# Patient Record
Sex: FEMALE | Race: OTHER | Employment: FULL TIME | URBAN - METROPOLITAN AREA
[De-identification: names, ages, dates, MRNs, and addresses within clinical notes are randomized per-mention and may not be internally consistent; named-entity substitution may affect disease eponyms.]

---

## 2017-07-03 ENCOUNTER — OFFICE VISIT (OUTPATIENT)
Dept: INTERNAL MEDICINE CLINIC | Facility: CLINIC | Age: 22
End: 2017-07-03

## 2017-07-03 VITALS
BODY MASS INDEX: 27.19 KG/M2 | DIASTOLIC BLOOD PRESSURE: 70 MMHG | TEMPERATURE: 98 F | HEART RATE: 63 BPM | HEIGHT: 66 IN | WEIGHT: 169.19 LBS | SYSTOLIC BLOOD PRESSURE: 106 MMHG

## 2017-07-03 DIAGNOSIS — Z02.0 SCHOOL PHYSICAL EXAM: Primary | ICD-10-CM

## 2017-07-03 LAB
INDURATION (): 0 MM (ref 0–11)
RUBV IGG SER-ACNC: 35.1 IU/ML

## 2017-07-03 PROCEDURE — 99212 OFFICE O/P EST SF 10 MIN: CPT | Performed by: INTERNAL MEDICINE

## 2017-07-03 PROCEDURE — 99204 OFFICE O/P NEW MOD 45 MIN: CPT | Performed by: INTERNAL MEDICINE

## 2017-07-03 PROCEDURE — 86580 TB INTRADERMAL TEST: CPT | Performed by: INTERNAL MEDICINE

## 2017-07-03 PROCEDURE — 36415 COLL VENOUS BLD VENIPUNCTURE: CPT | Performed by: INTERNAL MEDICINE

## 2017-07-03 PROCEDURE — 90715 TDAP VACCINE 7 YRS/> IM: CPT | Performed by: INTERNAL MEDICINE

## 2017-07-03 PROCEDURE — 90471 IMMUNIZATION ADMIN: CPT | Performed by: INTERNAL MEDICINE

## 2017-07-05 ENCOUNTER — NURSE ONLY (OUTPATIENT)
Dept: INTERNAL MEDICINE CLINIC | Facility: CLINIC | Age: 22
End: 2017-07-05

## 2017-07-05 ENCOUNTER — TELEPHONE (OUTPATIENT)
Dept: INTERNAL MEDICINE CLINIC | Facility: CLINIC | Age: 22
End: 2017-07-05

## 2017-07-05 DIAGNOSIS — Z02.0 SCHOOL PHYSICAL EXAM: Primary | ICD-10-CM

## 2017-07-05 LAB
HBV SURFACE AB SER-ACNC: <3.1 MIU/ML (ref ?–10)
HBV SURFACE AG SERPL QL IA: NONREACTIVE
MEV IGG SER-ACNC: <5 AU/ML (ref 30–?)
MUV IGG SER IA-ACNC: 28.8 AU/ML (ref 11–?)

## 2017-07-05 PROCEDURE — 90746 HEPB VACCINE 3 DOSE ADULT IM: CPT | Performed by: INTERNAL MEDICINE

## 2017-07-05 PROCEDURE — 90707 MMR VACCINE SC: CPT | Performed by: INTERNAL MEDICINE

## 2017-07-05 PROCEDURE — 90472 IMMUNIZATION ADMIN EACH ADD: CPT | Performed by: INTERNAL MEDICINE

## 2017-07-05 PROCEDURE — 90471 IMMUNIZATION ADMIN: CPT | Performed by: INTERNAL MEDICINE

## 2017-07-06 NOTE — TELEPHONE ENCOUNTER
Pt wants to know if instead of getting the second step to the TB test she can have a Quantiferon blood draw?

## 2017-07-06 NOTE — PROGRESS NOTES
HPI:    Patient ID: Eric Vitale is a 19year old female. HPI  Today for first time to establish care and get school physical.  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 Denies any complaints.     Re INTRADERMAL TEST      Hepatitis B Surface Antibody [E]      TETANUS, DIPHTHERIA TOXOIDS AND ACELLULAR PERTUSIS VACCINE (TDAP), >7 YEARS, IM USE    Meds This Visit:  No prescriptions requested or ordered in this encounter    Imaging & Referrals:  TETANUS, D

## 2017-07-06 NOTE — TELEPHONE ENCOUNTER
Patient aware she can do quanteferon test instead of the second step. Will call office back to schedule appointment.

## 2017-07-07 ENCOUNTER — TELEPHONE (OUTPATIENT)
Dept: INTERNAL MEDICINE CLINIC | Facility: CLINIC | Age: 22
End: 2017-07-07

## 2017-07-07 DIAGNOSIS — Z02.0 SCHOOL PHYSICAL EXAM: Primary | ICD-10-CM

## 2017-07-07 NOTE — TELEPHONE ENCOUNTER
Reviewed paperwork of patient. Patient does need series for MMR and for Hep B since she is not immune. Patient already received first shot for MMR and Hep B. Need to come in for next shot in 30 days. Patient also need blood draw for varicella.  Ok to place

## 2017-07-07 NOTE — TELEPHONE ENCOUNTER
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

## 2017-07-07 NOTE — TELEPHONE ENCOUNTER
Spoke with patient, informed that the new guideline is if she has received series during childhood we do a booster shot then repeat labs in 30 days to check immunity. If immune, no need to do further shots.  If not immune, patient needs to do the full serie

## 2017-07-07 NOTE — TELEPHONE ENCOUNTER
Since PCP is not here, answering messages. For hep. B titres, needs 30 days after vaccine series done. For MMR titres, needs 30 days after MMR done. If she has first series done as child,  guidelines are one shot of MMR and hep.  B and if immune dose not ne

## 2017-07-08 ENCOUNTER — APPOINTMENT (OUTPATIENT)
Dept: LAB | Facility: HOSPITAL | Age: 22
End: 2017-07-08
Attending: INTERNAL MEDICINE
Payer: COMMERCIAL

## 2017-07-08 ENCOUNTER — NURSE ONLY (OUTPATIENT)
Dept: INTERNAL MEDICINE CLINIC | Facility: CLINIC | Age: 22
End: 2017-07-08

## 2017-07-08 DIAGNOSIS — Z02.0 SCHOOL PHYSICAL EXAM: ICD-10-CM

## 2017-07-08 PROCEDURE — 86787 VARICELLA-ZOSTER ANTIBODY: CPT | Performed by: INTERNAL MEDICINE

## 2017-07-08 PROCEDURE — 86480 TB TEST CELL IMMUN MEASURE: CPT | Performed by: INTERNAL MEDICINE

## 2017-07-08 PROCEDURE — 36415 COLL VENOUS BLD VENIPUNCTURE: CPT | Performed by: INTERNAL MEDICINE

## 2017-07-10 LAB
M TB IFN-G CD4+ BCKGRND COR BLD-ACNC: 0.01 IU/ML
M TB IFN-G CD4+ T-CELLS BLD-ACNC: 0.04 IU/ML
M TB TUBERC IFN-G BLD QL: NEGATIVE
M TB TUBERC IGNF/MITOGEN IGNF CONTROL: 8.1 IU/ML
VZV IGG SER IA-ACNC: 336.4 (ref 165–?)

## 2017-07-12 DIAGNOSIS — Z02.0 SCHOOL PHYSICAL EXAM: Primary | ICD-10-CM

## 2017-08-07 ENCOUNTER — OFFICE VISIT (OUTPATIENT)
Dept: OBGYN CLINIC | Facility: CLINIC | Age: 22
End: 2017-08-07

## 2017-08-07 VITALS
WEIGHT: 170.5 LBS | DIASTOLIC BLOOD PRESSURE: 62 MMHG | HEIGHT: 66 IN | SYSTOLIC BLOOD PRESSURE: 108 MMHG | BODY MASS INDEX: 27.4 KG/M2

## 2017-08-07 DIAGNOSIS — Z01.419 ENCOUNTER FOR GYNECOLOGICAL EXAMINATION WITHOUT ABNORMAL FINDING: Primary | ICD-10-CM

## 2017-08-07 DIAGNOSIS — R87.612 PAPANICOLAOU SMEAR OF CERVIX WITH LOW GRADE SQUAMOUS INTRAEPITHELIAL LESION (LGSIL): ICD-10-CM

## 2017-08-07 DIAGNOSIS — N92.0 MENORRHAGIA WITH REGULAR CYCLE: ICD-10-CM

## 2017-08-07 PROCEDURE — 87591 N.GONORRHOEAE DNA AMP PROB: CPT | Performed by: OBSTETRICS & GYNECOLOGY

## 2017-08-07 PROCEDURE — 88175 CYTOPATH C/V AUTO FLUID REDO: CPT | Performed by: OBSTETRICS & GYNECOLOGY

## 2017-08-07 PROCEDURE — 87491 CHLMYD TRACH DNA AMP PROBE: CPT | Performed by: OBSTETRICS & GYNECOLOGY

## 2017-08-07 PROCEDURE — 99385 PREV VISIT NEW AGE 18-39: CPT | Performed by: OBSTETRICS & GYNECOLOGY

## 2017-08-07 NOTE — PROGRESS NOTES
GYN H&P     2017  1:30 PM    CC: Patient is here for for pap. HPI: Patient is a 19year old  for for pap. 2016 she had an abnormal pap LGSIL. Was told to get repeat pap. 2015 was positive for HPV HR.  She did complete the Gardasil vacci         Feels safe, no h/o physical or sexual abuse. /62 (BP Location: Left arm, Patient Position: Sitting, Cuff Size: adult)   Ht 66\"   Wt 170 lb 8 oz   LMP 07/14/2017 (Exact Date)   Breastfeeding?  No   BMI 27.52 kg/m²       Exam:

## 2017-08-09 ENCOUNTER — TELEPHONE (OUTPATIENT)
Dept: INTERNAL MEDICINE CLINIC | Facility: CLINIC | Age: 22
End: 2017-08-09

## 2017-08-09 LAB
C TRACH DNA SPEC QL NAA+PROBE: NEGATIVE
N GONORRHOEA DNA SPEC QL NAA+PROBE: NEGATIVE

## 2017-08-09 NOTE — TELEPHONE ENCOUNTER
Pt has no immunity to Rubeola (measles 0 and also no immunity to hep B, she needs to get a booster shots for both MMR and Hep B and then we can recheck the titters in 1 month from the time the vaccines were given

## 2017-08-09 NOTE — TELEPHONE ENCOUNTER
Pt received MMR and Hep B on 07/5/17. Pt wants to know if she needs a 2nd dose for both immunizations or if she's done?

## 2017-08-10 NOTE — TELEPHONE ENCOUNTER
Pt states she was told in her ov that we give 2 doses of MMR and Hep B. Wants to know, if she re-checks titers, what are the chances she would be immune with only 1 dose of the vaccines?

## 2017-08-10 NOTE — TELEPHONE ENCOUNTER
Usually when first time pt starts getting this immunization as a child it needs to get 2 MMR vaccines, so since she has had those this is just a booster that is needed.  Same with hep B, if pt has never had the hep vaccine than needs all 3 series but if she

## 2017-08-10 NOTE — TELEPHONE ENCOUNTER
Usually not, one buster sheould be enough and most would say its ok but if her job requires titers than we can recheck now

## 2017-08-24 ENCOUNTER — OFFICE VISIT (OUTPATIENT)
Dept: OBGYN CLINIC | Facility: CLINIC | Age: 22
End: 2017-08-24

## 2017-08-24 VITALS
WEIGHT: 170 LBS | BODY MASS INDEX: 27.32 KG/M2 | DIASTOLIC BLOOD PRESSURE: 64 MMHG | HEIGHT: 66 IN | SYSTOLIC BLOOD PRESSURE: 102 MMHG

## 2017-08-24 DIAGNOSIS — Z32.02 PREGNANCY EXAMINATION OR TEST, NEGATIVE RESULT: Primary | ICD-10-CM

## 2017-08-24 DIAGNOSIS — R87.612 PAPANICOLAOU SMEAR OF CERVIX WITH LOW GRADE SQUAMOUS INTRAEPITHELIAL LESION (LGSIL): ICD-10-CM

## 2017-08-24 LAB
CONTROL LINE PRESENT WITH A CLEAR BACKGROUND (YES/NO): YES YES/NO
KIT EXPIRATION DATE: NORMAL DATE
KIT LOT #: NORMAL NUMERIC
PREGNANCY TEST, URINE: NEGATIVE

## 2017-08-24 PROCEDURE — 81025 URINE PREGNANCY TEST: CPT | Performed by: OBSTETRICS & GYNECOLOGY

## 2017-08-24 PROCEDURE — 88305 TISSUE EXAM BY PATHOLOGIST: CPT | Performed by: OBSTETRICS & GYNECOLOGY

## 2017-08-24 PROCEDURE — 57454 BX/CURETT OF CERVIX W/SCOPE: CPT | Performed by: OBSTETRICS & GYNECOLOGY

## 2017-08-24 NOTE — PROGRESS NOTES
Colposcopy    Pap = LGSIL    Patient was positioned in stir-ups. She was consented for colposcopy and possible biopsies. I discussed with her to expect some cramping and light vaginal bleeding after the procedure.   I explained that she would be notified

## 2017-08-29 ENCOUNTER — TELEPHONE (OUTPATIENT)
Dept: OBGYN CLINIC | Facility: CLINIC | Age: 22
End: 2017-08-29

## 2017-09-07 ENCOUNTER — APPOINTMENT (OUTPATIENT)
Dept: LAB | Facility: HOSPITAL | Age: 22
End: 2017-09-07
Attending: INTERNAL MEDICINE
Payer: COMMERCIAL

## 2017-09-07 DIAGNOSIS — Z02.0 ENCOUNTER FOR SCHOOL EXAMINATION: ICD-10-CM

## 2017-09-07 DIAGNOSIS — Z02.0 SCHOOL PHYSICAL EXAM: ICD-10-CM

## 2017-09-07 PROCEDURE — 86706 HEP B SURFACE ANTIBODY: CPT

## 2017-09-07 PROCEDURE — 86765 RUBEOLA ANTIBODY: CPT

## 2017-09-07 PROCEDURE — 36415 COLL VENOUS BLD VENIPUNCTURE: CPT

## 2017-09-07 PROCEDURE — 87340 HEPATITIS B SURFACE AG IA: CPT

## 2017-09-08 LAB
HBV SURFACE AB SER-ACNC: <3.1 MIU/ML (ref ?–10)
HBV SURFACE AG SERPL QL IA: NONREACTIVE
HBV SURFACE AG SERPL QL IA: NONREACTIVE
MEV IGG SER-ACNC: <5 AU/ML (ref 30–?)

## 2019-02-13 ENCOUNTER — OFFICE VISIT (OUTPATIENT)
Dept: FAMILY MEDICINE CLINIC | Facility: CLINIC | Age: 24
End: 2019-02-13

## 2019-02-13 VITALS
DIASTOLIC BLOOD PRESSURE: 62 MMHG | HEART RATE: 62 BPM | TEMPERATURE: 99 F | BODY MASS INDEX: 26.11 KG/M2 | RESPIRATION RATE: 16 BRPM | SYSTOLIC BLOOD PRESSURE: 102 MMHG | HEIGHT: 64.76 IN | WEIGHT: 154.81 LBS

## 2019-02-13 DIAGNOSIS — Z00.00 ROUTINE PHYSICAL EXAMINATION: ICD-10-CM

## 2019-02-13 DIAGNOSIS — Z86.2 HISTORY OF ANEMIA: Primary | ICD-10-CM

## 2019-02-13 PROCEDURE — 90707 MMR VACCINE SC: CPT | Performed by: FAMILY MEDICINE

## 2019-02-13 PROCEDURE — 90471 IMMUNIZATION ADMIN: CPT | Performed by: FAMILY MEDICINE

## 2019-02-13 PROCEDURE — 99203 OFFICE O/P NEW LOW 30 MIN: CPT | Performed by: FAMILY MEDICINE

## 2019-02-13 PROCEDURE — 90746 HEPB VACCINE 3 DOSE ADULT IM: CPT | Performed by: FAMILY MEDICINE

## 2019-02-13 PROCEDURE — 99212 OFFICE O/P EST SF 10 MIN: CPT | Performed by: FAMILY MEDICINE

## 2019-02-13 PROCEDURE — 90472 IMMUNIZATION ADMIN EACH ADD: CPT | Performed by: FAMILY MEDICINE

## 2019-02-13 NOTE — PROGRESS NOTES
HPI: Hung Portillo is a 20year old female who presents for establishment of care. Pt has a history of alpha thalassemia. Diagnosed as a child. She was anemic during pregnancy but improved during pregnancy.  with one girl. Finishing nursing school.  Has

## 2019-02-27 ENCOUNTER — PATIENT MESSAGE (OUTPATIENT)
Dept: FAMILY MEDICINE CLINIC | Facility: CLINIC | Age: 24
End: 2019-02-27

## 2019-02-28 NOTE — PATIENT INSTRUCTIONS
Understanding Uterine Bleeding  Your uterine bleeding may be heavy. Or you may have bleeding between periods. These problems may be caused by hormonal imbalance.  Or they can be caused by uterine growths, an intrauterine device (IUD), bleeding disorder, o © 8516-9403 [employer] [address]. [address] [facility], [facility] [place]. All rights reserved. This information is not intended as a substitute for professional medical care. Always follow your healthcare professional's instructions.

## 2019-02-28 NOTE — TELEPHONE ENCOUNTER
From: Dragan Martinez  To: Kranthi Garcia DO  Sent: 2/27/2019 7:47 PM CST  Subject: Visit Nancy Gaines,     I forgot when I am supposed to receive the next Hep B vaccine dose. I think you said 2 months.  I saw you on February 13t

## 2019-03-02 ENCOUNTER — LAB ENCOUNTER (OUTPATIENT)
Dept: LAB | Age: 24
End: 2019-03-02
Attending: FAMILY MEDICINE
Payer: COMMERCIAL

## 2019-03-02 DIAGNOSIS — Z86.2 HISTORY OF ANEMIA: ICD-10-CM

## 2019-03-02 DIAGNOSIS — Z00.00 ROUTINE PHYSICAL EXAMINATION: ICD-10-CM

## 2019-03-02 LAB
ALBUMIN SERPL-MCNC: 3.8 G/DL (ref 3.4–5)
ALBUMIN/GLOB SERPL: 1.1 {RATIO} (ref 1–2)
ALP LIVER SERPL-CCNC: 67 U/L (ref 37–98)
ALT SERPL-CCNC: 14 U/L (ref 13–56)
ANION GAP SERPL CALC-SCNC: 7 MMOL/L (ref 0–18)
AST SERPL-CCNC: 12 U/L (ref 15–37)
BILIRUB SERPL-MCNC: 0.3 MG/DL (ref 0.1–2)
BUN BLD-MCNC: 9 MG/DL (ref 7–18)
BUN/CREAT SERPL: 16.7 (ref 10–20)
CALCIUM BLD-MCNC: 8.6 MG/DL (ref 8.5–10.1)
CHLORIDE SERPL-SCNC: 106 MMOL/L (ref 98–107)
CHOLEST SMN-MCNC: 181 MG/DL (ref ?–200)
CO2 SERPL-SCNC: 25 MMOL/L (ref 21–32)
CREAT BLD-MCNC: 0.54 MG/DL (ref 0.55–1.02)
DEPRECATED HBV CORE AB SER IA-ACNC: 13.2 NG/ML (ref 12–114)
GLOBULIN PLAS-MCNC: 3.4 G/DL (ref 2.8–4.4)
GLUCOSE BLD-MCNC: 99 MG/DL (ref 70–99)
HDLC SERPL-MCNC: 48 MG/DL (ref 40–59)
IRON SATURATION: 23 % (ref 15–50)
IRON SERPL-MCNC: 82 UG/DL (ref 50–170)
LDLC SERPL CALC-MCNC: 99 MG/DL (ref ?–100)
M PROTEIN MFR SERPL ELPH: 7.2 G/DL (ref 6.4–8.2)
NONHDLC SERPL-MCNC: 133 MG/DL (ref ?–130)
OSMOLALITY SERPL CALC.SUM OF ELEC: 285 MOSM/KG (ref 275–295)
POTASSIUM SERPL-SCNC: 3.8 MMOL/L (ref 3.5–5.1)
SODIUM SERPL-SCNC: 138 MMOL/L (ref 136–145)
TOTAL IRON BINDING CAPACITY: 364 UG/DL (ref 240–450)
TRANSFERRIN SERPL-MCNC: 244 MG/DL (ref 200–360)
TRIGL SERPL-MCNC: 168 MG/DL (ref 30–149)
TSI SER-ACNC: 1.79 MIU/ML (ref 0.36–3.74)
VLDLC SERPL CALC-MCNC: 34 MG/DL (ref 0–30)

## 2019-03-02 PROCEDURE — 36415 COLL VENOUS BLD VENIPUNCTURE: CPT

## 2019-03-02 PROCEDURE — 84443 ASSAY THYROID STIM HORMONE: CPT

## 2019-03-02 PROCEDURE — 82306 VITAMIN D 25 HYDROXY: CPT

## 2019-03-02 PROCEDURE — 84466 ASSAY OF TRANSFERRIN: CPT

## 2019-03-02 PROCEDURE — 83540 ASSAY OF IRON: CPT

## 2019-03-02 PROCEDURE — 85060 BLOOD SMEAR INTERPRETATION: CPT

## 2019-03-02 PROCEDURE — 82728 ASSAY OF FERRITIN: CPT

## 2019-03-02 PROCEDURE — 80053 COMPREHEN METABOLIC PANEL: CPT

## 2019-03-02 PROCEDURE — 85025 COMPLETE CBC W/AUTO DIFF WBC: CPT

## 2019-03-02 PROCEDURE — 80061 LIPID PANEL: CPT

## 2019-03-04 LAB
25(OH)D3 SERPL-MCNC: 9.2 NG/ML (ref 30–100)
BASOPHILS # BLD AUTO: 0.02 X10(3) UL (ref 0–0.2)
BASOPHILS NFR BLD AUTO: 0.4 %
DEPRECATED RDW RBC AUTO: 43.7 FL (ref 35.1–46.3)
EOSINOPHIL # BLD AUTO: 0.05 X10(3) UL (ref 0–0.7)
EOSINOPHIL NFR BLD AUTO: 1 %
ERYTHROCYTE [DISTWIDTH] IN BLOOD BY AUTOMATED COUNT: 19.6 % (ref 11–15)
HCT VFR BLD AUTO: 36.6 % (ref 35–48)
HGB BLD-MCNC: 11.4 G/DL (ref 12–16)
IMM GRANULOCYTES # BLD AUTO: 0.01 X10(3) UL (ref 0–1)
IMM GRANULOCYTES NFR BLD: 0.2 %
LYMPHOCYTES # BLD AUTO: 1.51 X10(3) UL (ref 1–4)
LYMPHOCYTES NFR BLD AUTO: 30.1 %
MCH RBC QN AUTO: 20.8 PG (ref 26–34)
MCHC RBC AUTO-ENTMCNC: 31.1 G/DL (ref 31–37)
MCV RBC AUTO: 66.9 FL (ref 80–100)
MONOCYTES # BLD AUTO: 0.38 X10(3) UL (ref 0.1–1)
MONOCYTES NFR BLD AUTO: 7.6 %
NEUTROPHILS # BLD AUTO: 3.05 X10 (3) UL (ref 1.5–7.7)
NEUTROPHILS # BLD AUTO: 3.05 X10(3) UL (ref 1.5–7.7)
NEUTROPHILS NFR BLD AUTO: 60.7 %
PLATELET # BLD AUTO: 238 10(3)UL (ref 150–450)
PLATELET MORPHOLOGY: NORMAL
RBC # BLD AUTO: 5.47 X10(6)UL (ref 3.8–5.3)
WBC # BLD AUTO: 5 X10(3) UL (ref 4–11)

## 2019-03-18 ENCOUNTER — OFFICE VISIT (OUTPATIENT)
Dept: FAMILY MEDICINE CLINIC | Facility: CLINIC | Age: 24
End: 2019-03-18

## 2019-03-18 VITALS
TEMPERATURE: 97 F | HEART RATE: 60 BPM | SYSTOLIC BLOOD PRESSURE: 108 MMHG | DIASTOLIC BLOOD PRESSURE: 71 MMHG | WEIGHT: 157 LBS | RESPIRATION RATE: 16 BRPM | BODY MASS INDEX: 26 KG/M2

## 2019-03-18 DIAGNOSIS — D64.9 ANEMIA, UNSPECIFIED TYPE: ICD-10-CM

## 2019-03-18 DIAGNOSIS — M54.2 NECK PAIN: ICD-10-CM

## 2019-03-18 DIAGNOSIS — Z00.00 ROUTINE PHYSICAL EXAMINATION: Primary | ICD-10-CM

## 2019-03-18 DIAGNOSIS — Z01.84 IMMUNITY STATUS TESTING: ICD-10-CM

## 2019-03-18 DIAGNOSIS — E55.9 VITAMIN D DEFICIENCY: ICD-10-CM

## 2019-03-18 PROCEDURE — 99395 PREV VISIT EST AGE 18-39: CPT | Performed by: FAMILY MEDICINE

## 2019-03-18 RX ORDER — FERROUS SULFATE 325(65) MG
325 TABLET ORAL
Qty: 90 TABLET | Refills: 1 | Status: SHIPPED | OUTPATIENT
Start: 2019-03-18

## 2019-03-18 NOTE — PROGRESS NOTES
HPI:  20 yr old female who presents for physical.  with one child, girl. Goes to nursing school. States he stays active but no regular exercise. Cooks daily. She does have diet high in carbs. Saw Gyne in the past.  She was HPV positive.   Has p Clear to ausculation; good aeration               No wheezes, rales or rhonchi  Abd: soft, non-tender, non-distended          Normal bowel sounds; no masses          No hepatosplenomegaly  Extremities: No cyanosis, clubbing, edema. Pedal pulses 2+ magnolia.   [place]

## 2019-03-18 NOTE — PATIENT INSTRUCTIONS
Take Ferrous Sulfate once a day. Script sent to pharmacy. Will repeat levels in 3 months. Take Vitamin D 50,000 IU weekly for the next 8 weeks. After 8 weeks, switch to 2000 IU daily, which can be purchased over the counter.   We should plan to reche

## 2019-04-09 ENCOUNTER — TELEPHONE (OUTPATIENT)
Dept: FAMILY MEDICINE CLINIC | Facility: CLINIC | Age: 24
End: 2019-04-09

## 2019-04-09 NOTE — TELEPHONE ENCOUNTER
Patient wants to come in for Pap only and first open physical slot is 06/20/2019. Is it ok to book outside of a physical slot.      Please reply to pool: ELIZABETH Bustillos

## 2019-04-16 ENCOUNTER — NURSE ONLY (OUTPATIENT)
Dept: FAMILY MEDICINE CLINIC | Facility: CLINIC | Age: 24
End: 2019-04-16

## 2019-04-16 DIAGNOSIS — Z23 NEED FOR HEPATITIS B VACCINATION: Primary | ICD-10-CM

## 2019-04-16 PROCEDURE — 90746 HEPB VACCINE 3 DOSE ADULT IM: CPT | Performed by: FAMILY MEDICINE

## 2019-04-16 PROCEDURE — 90471 IMMUNIZATION ADMIN: CPT | Performed by: FAMILY MEDICINE

## 2019-07-09 ENCOUNTER — TELEPHONE (OUTPATIENT)
Dept: FAMILY MEDICINE CLINIC | Facility: CLINIC | Age: 24
End: 2019-07-09

## 2019-07-09 NOTE — TELEPHONE ENCOUNTER
Pt scheduled an appt for her pap and blood drawn on 9-11-19. Pt would like to know if it is ok to get her HEP B vaccine done that same day.

## 2019-09-23 ENCOUNTER — OFFICE VISIT (OUTPATIENT)
Dept: OBGYN CLINIC | Facility: CLINIC | Age: 24
End: 2019-09-23

## 2019-09-23 VITALS
DIASTOLIC BLOOD PRESSURE: 67 MMHG | WEIGHT: 158.63 LBS | SYSTOLIC BLOOD PRESSURE: 116 MMHG | HEART RATE: 73 BPM | BODY MASS INDEX: 27 KG/M2

## 2019-09-23 DIAGNOSIS — N92.6 MISSED MENSES: Primary | ICD-10-CM

## 2019-09-23 LAB
CONTROL LINE PRESENT WITH A CLEAR BACKGROUND (YES/NO): YES YES/NO
KIT LOT #: NORMAL NUMERIC
PREGNANCY TEST, URINE: POSITIVE

## 2019-09-23 PROCEDURE — 81025 URINE PREGNANCY TEST: CPT | Performed by: ADVANCED PRACTICE MIDWIFE

## 2019-09-23 PROCEDURE — 99202 OFFICE O/P NEW SF 15 MIN: CPT | Performed by: ADVANCED PRACTICE MIDWIFE

## 2019-10-07 ENCOUNTER — LAB ENCOUNTER (OUTPATIENT)
Dept: LAB | Facility: HOSPITAL | Age: 24
End: 2019-10-07
Attending: ADVANCED PRACTICE MIDWIFE
Payer: COMMERCIAL

## 2019-10-07 ENCOUNTER — NURSE ONLY (OUTPATIENT)
Dept: OBGYN CLINIC | Facility: CLINIC | Age: 24
End: 2019-10-07

## 2019-10-07 VITALS — BODY MASS INDEX: 26 KG/M2 | WEIGHT: 156 LBS

## 2019-10-07 DIAGNOSIS — Z3A.08 8 WEEKS GESTATION OF PREGNANCY: ICD-10-CM

## 2019-10-07 DIAGNOSIS — Z3A.08 8 WEEKS GESTATION OF PREGNANCY: Primary | ICD-10-CM

## 2019-10-07 DIAGNOSIS — D56.3 THALASSEMIA ALPHA CARRIER: ICD-10-CM

## 2019-10-07 PROCEDURE — 86803 HEPATITIS C AB TEST: CPT

## 2019-10-07 PROCEDURE — 86780 TREPONEMA PALLIDUM: CPT

## 2019-10-07 PROCEDURE — 36415 COLL VENOUS BLD VENIPUNCTURE: CPT

## 2019-10-07 PROCEDURE — 85025 COMPLETE CBC W/AUTO DIFF WBC: CPT

## 2019-10-07 PROCEDURE — 86850 RBC ANTIBODY SCREEN: CPT

## 2019-10-07 PROCEDURE — 87389 HIV-1 AG W/HIV-1&-2 AB AG IA: CPT

## 2019-10-07 PROCEDURE — 86762 RUBELLA ANTIBODY: CPT

## 2019-10-07 PROCEDURE — 86900 BLOOD TYPING SEROLOGIC ABO: CPT

## 2019-10-07 PROCEDURE — 86901 BLOOD TYPING SEROLOGIC RH(D): CPT

## 2019-10-07 PROCEDURE — 87086 URINE CULTURE/COLONY COUNT: CPT

## 2019-10-07 PROCEDURE — 87340 HEPATITIS B SURFACE AG IA: CPT

## 2019-10-07 PROCEDURE — 85060 BLOOD SMEAR INTERPRETATION: CPT

## 2019-10-07 RX ORDER — BIOTIN 1 MG
1 TABLET ORAL DAILY
COMMUNITY

## 2019-10-07 RX ORDER — PRENATAL VIT/IRON FUM/FOLIC AC 27MG-0.8MG
1 TABLET ORAL DAILY
COMMUNITY

## 2019-10-07 NOTE — PROGRESS NOTES
Pt is here today for OB RN education visit, educational material reviewed. Pt verbalized understanding. Orders placed for NOB labs including HCV. Pt has hx of positive HPV and LGSIL on pap smear.  Pt's last pap smear was in 2018 and was normal, pt signed Kera Rank

## 2019-10-08 ENCOUNTER — TELEPHONE (OUTPATIENT)
Dept: OBGYN CLINIC | Facility: CLINIC | Age: 24
End: 2019-10-08

## 2019-10-08 PROBLEM — D50.9 MICROCYTIC ANEMIA: Status: ACTIVE | Noted: 2019-10-08

## 2019-10-08 NOTE — TELEPHONE ENCOUNTER
PT RETURNING NURSE CALL Detail Level: Detailed Duration Of Freeze Thaw-Cycle (Seconds): 0 Post-Care Instructions: I reviewed with the patient in detail post-care instructions. Patient is to wear sunprotection, and avoid picking at any of the treated lesions. Pt may apply Vaseline to crusted or scabbing areas. Consent: The patient's consent was obtained including but not limited to risks of crusting, scabbing, blistering, scarring, darker or lighter pigmentary change, recurrence, incomplete removal and infection. Render Post-Care Instructions In Note?: no Medical Necessity Clause: This procedure was medically necessary because the lesions that were treated were: Medical Necessity Information: It is in your best interest to select a reason for this procedure from the list below. All of these items fulfill various CMS LCD requirements except the new and changing color options.

## 2019-10-08 NOTE — TELEPHONE ENCOUNTER
----- Message from Lucia Joel CNM sent at 10/8/2019  1:36 PM CDT -----  Please inform patient that all prenatal labs were normal thus far expect CBC. She continues to demonstrate microcytic anemia.   I would like her to have the iron studies done that

## 2019-10-29 ENCOUNTER — TELEPHONE (OUTPATIENT)
Dept: OBGYN CLINIC | Facility: CLINIC | Age: 24
End: 2019-10-29

## 2019-10-29 NOTE — TELEPHONE ENCOUNTER
----- Message from Morelia Tam CNM sent at 10/29/2019  7:43 AM CDT -----  Please remind patient to have her labs drawn

## 2019-10-29 NOTE — TELEPHONE ENCOUNTER
Spoke with pt and reminded pt to have labs drawn. Pt agreed and voiced understanding, will try to go to lab before her appt on 11/4.

## 2019-11-21 ENCOUNTER — INITIAL PRENATAL (OUTPATIENT)
Dept: OBGYN CLINIC | Facility: CLINIC | Age: 24
End: 2019-11-21
Payer: COMMERCIAL

## 2019-11-21 VITALS
BODY MASS INDEX: 25.8 KG/M2 | HEIGHT: 64.76 IN | SYSTOLIC BLOOD PRESSURE: 100 MMHG | DIASTOLIC BLOOD PRESSURE: 60 MMHG | HEART RATE: 62 BPM | WEIGHT: 153 LBS

## 2019-11-21 DIAGNOSIS — Z12.4 ENCOUNTER FOR PAPANICOLAOU SMEAR FOR CERVICAL CANCER SCREENING: ICD-10-CM

## 2019-11-21 DIAGNOSIS — Z34.82 PRENATAL CARE, SUBSEQUENT PREGNANCY, SECOND TRIMESTER: Primary | ICD-10-CM

## 2019-11-21 DIAGNOSIS — Z12.4 SCREENING FOR CERVICAL CANCER: ICD-10-CM

## 2019-11-21 PROCEDURE — 81002 URINALYSIS NONAUTO W/O SCOPE: CPT | Performed by: ADVANCED PRACTICE MIDWIFE

## 2019-11-21 NOTE — PROGRESS NOTES
Doing ok, still nauseous. Aware still needs to have labs drawn for iron studies. PE/PAP today. Rev warnings/when to call.

## 2019-11-26 ENCOUNTER — TELEPHONE (OUTPATIENT)
Dept: OBGYN CLINIC | Facility: CLINIC | Age: 24
End: 2019-11-26

## 2019-11-26 PROBLEM — Z34.90 ENCOUNTER FOR SUPERVISION OF NORMAL PREGNANCY: Status: ACTIVE | Noted: 2019-11-26

## 2019-11-26 PROBLEM — D56.3 ALPHA THALASSAEMIA MINOR: Status: ACTIVE | Noted: 2019-11-26

## 2019-11-26 PROBLEM — Z34.90 ENCOUNTER FOR SUPERVISION OF NORMAL PREGNANCY (HCC): Status: ACTIVE | Noted: 2019-11-26

## 2019-11-26 PROBLEM — N92.0 MENORRHAGIA: Status: RESOLVED | Noted: 2017-08-07 | Resolved: 2019-11-26

## 2019-11-27 NOTE — TELEPHONE ENCOUNTER
----- Message from JOHN Clay sent at 11/26/2019  6:32 PM CST -----  Please advise that PAP is normal but shows signs of BV. If patient is having itching irritation or odor please offer Metronidazole 500mg bid x7d.   No Metrogel in pregnancy

## 2019-11-27 NOTE — TELEPHONE ENCOUNTER
Notified pt of results & instructions per BR. Pt denies symptoms other than a slight odor that she has had all along. Declines rx for now. Will notify if any changes.  Pt verbalized an understanding & agrees w/ plan

## 2019-12-18 ENCOUNTER — ROUTINE PRENATAL (OUTPATIENT)
Dept: OBGYN CLINIC | Facility: CLINIC | Age: 24
End: 2019-12-18
Payer: COMMERCIAL

## 2019-12-18 VITALS
WEIGHT: 154.81 LBS | DIASTOLIC BLOOD PRESSURE: 67 MMHG | BODY MASS INDEX: 26 KG/M2 | HEART RATE: 83 BPM | SYSTOLIC BLOOD PRESSURE: 106 MMHG

## 2019-12-18 DIAGNOSIS — Z34.82 ENCOUNTER FOR SUPERVISION OF OTHER NORMAL PREGNANCY IN SECOND TRIMESTER: Primary | ICD-10-CM

## 2019-12-18 PROCEDURE — 81002 URINALYSIS NONAUTO W/O SCOPE: CPT | Performed by: ADVANCED PRACTICE MIDWIFE

## 2020-01-03 ENCOUNTER — TELEPHONE (OUTPATIENT)
Dept: PERINATAL CARE | Facility: HOSPITAL | Age: 25
End: 2020-01-03

## 2020-01-03 NOTE — TELEPHONE ENCOUNTER
Patient LVM 1/2/20 with instructions not to call until after 3:50pm.  I was not able to return her phone call. Returned patients call 1/3/20 8:20 am.  Offered first available on 1/14/20 patient declined.   Patient wanted first available 3pm, scheduled for 1

## 2020-01-17 ENCOUNTER — TELEPHONE (OUTPATIENT)
Dept: OBGYN CLINIC | Facility: CLINIC | Age: 25
End: 2020-01-17

## 2020-01-17 NOTE — TELEPHONE ENCOUNTER
PER PATIENT REQUESTING TO HAVE THE US PRIOR AUTHORIZATIONS ON IT / SHE HAS AN APPOINTMENT SCHEDULE FOR AN US NEXT WEEK ON 01/23/2020 / Gato Rea

## 2020-01-18 NOTE — TELEPHONE ENCOUNTER
Advised pt I will need to call on Monday for PA. According to website, PA is not needed for u/s but will call when open. Pt also req coding chage for ap completed 11/21/19 as per her insurance it needs to be preventative acre not pregnancy. Routed to BHARAT Hernandez

## 2020-01-20 NOTE — TELEPHONE ENCOUNTER
Attempted to contact pt's insurance but the office is closed due to [facility] holiday.

## 2020-01-20 NOTE — TELEPHONE ENCOUNTER
Spoke with Lio Mace in the lab to see how we can change the diagnosis code used for the Thinprep Pap done on 11/21/19. I was advised to print the original lab order and update the code to Z12.4 Screening for Cervical Cancer.  This is to be faxed to Geovanna HOPSON

## 2020-01-22 NOTE — TELEPHONE ENCOUNTER
Lm for pt advising that level 1 u/s does not need PA per her insurance & the request for the diagnosis code to be changed for the pap was sent to billing & coding

## 2020-01-23 ENCOUNTER — HOSPITAL ENCOUNTER (OUTPATIENT)
Dept: PERINATAL CARE | Facility: HOSPITAL | Age: 25
Discharge: HOME | End: 2020-01-23
Attending: OBSTETRICS & GYNECOLOGY
Payer: COMMERCIAL

## 2020-01-23 ENCOUNTER — HOSPITAL ENCOUNTER (OUTPATIENT)
Dept: PERINATAL CARE | Facility: HOSPITAL | Age: 25
Discharge: HOME | End: 2020-01-23
Attending: ADVANCED PRACTICE MIDWIFE
Payer: COMMERCIAL

## 2020-01-23 VITALS
HEIGHT: 64 IN | HEART RATE: 101 BPM | WEIGHT: 121 LBS | DIASTOLIC BLOOD PRESSURE: 75 MMHG | SYSTOLIC BLOOD PRESSURE: 124 MMHG | BODY MASS INDEX: 20.66 KG/M2

## 2020-01-23 DIAGNOSIS — Z36.3 SCREENING, ANTENATAL, FOR MALFORMATION BY ULTRASOUND: ICD-10-CM

## 2020-01-23 DIAGNOSIS — Z36.3 SCREENING, ANTENATAL, FOR MALFORMATION BY ULTRASOUND: Primary | ICD-10-CM

## 2020-01-23 PROCEDURE — 76805 OB US >/= 14 WKS SNGL FETUS: CPT | Performed by: OBSTETRICS & GYNECOLOGY

## 2020-01-23 NOTE — PROGRESS NOTES
/75   Pulse 101   Ht 5' 4\" (1.626 m)   Wt 121 lb (54.9 kg)   LMP 08/11/2019 (Exact Date)   BMI 20.77 kg/m²       OB ULTRASOUND REPORT   See imaging tab for complete ultrasound report or in PACS    Fetal Heart Rate: Present 152 bpm  Fetal Presentatio

## 2020-01-25 ENCOUNTER — ROUTINE PRENATAL (OUTPATIENT)
Dept: OBGYN CLINIC | Facility: CLINIC | Age: 25
End: 2020-01-25
Payer: COMMERCIAL

## 2020-01-25 VITALS
SYSTOLIC BLOOD PRESSURE: 112 MMHG | BODY MASS INDEX: 28 KG/M2 | DIASTOLIC BLOOD PRESSURE: 73 MMHG | HEART RATE: 83 BPM | WEIGHT: 160.19 LBS

## 2020-01-25 DIAGNOSIS — Z34.82 ENCOUNTER FOR SUPERVISION OF OTHER NORMAL PREGNANCY IN SECOND TRIMESTER: Primary | ICD-10-CM

## 2020-01-25 LAB
MULTISTIX LOT#: NORMAL NUMERIC
PH, URINE: 6 (ref 4.5–8)
SPECIFIC GRAVITY: 1.03 (ref 1–1.03)
UROBILINOGEN,SEMI-QN: 1 MG/DL (ref 0–1.9)

## 2020-01-25 PROCEDURE — 81002 URINALYSIS NONAUTO W/O SCOPE: CPT | Performed by: ADVANCED PRACTICE MIDWIFE

## 2020-01-25 NOTE — PROGRESS NOTES
Works night shift as Peds RN. Diet was poor in beginning with working nights and not feeling well in 1st trimester. Is getting better. Had normal u/s, it's a girl. Has a girl at home and a stepson. +FM. Denies pain or bleeding. Had flu vaccine at work.

## 2020-01-28 ENCOUNTER — TELEPHONE (OUTPATIENT)
Dept: OBGYN CLINIC | Facility: CLINIC | Age: 25
End: 2020-01-28

## 2020-01-28 NOTE — TELEPHONE ENCOUNTER
Please call normal Level 1 u/s  1. IUP @  23w4d  2. Scan consistent with dates  3.  No fetal structural abnormalities seen    We will discuss at next PN visit

## 2020-02-19 ENCOUNTER — TELEPHONE (OUTPATIENT)
Dept: OBGYN CLINIC | Facility: CLINIC | Age: 25
End: 2020-02-19

## 2020-02-21 ENCOUNTER — ROUTINE PRENATAL (OUTPATIENT)
Dept: OBGYN CLINIC | Facility: CLINIC | Age: 25
End: 2020-02-21
Payer: COMMERCIAL

## 2020-02-21 VITALS
HEART RATE: 80 BPM | DIASTOLIC BLOOD PRESSURE: 64 MMHG | SYSTOLIC BLOOD PRESSURE: 95 MMHG | BODY MASS INDEX: 28 KG/M2 | WEIGHT: 160.19 LBS

## 2020-02-21 DIAGNOSIS — Z34.83 PRENATAL CARE, SUBSEQUENT PREGNANCY IN THIRD TRIMESTER: Primary | ICD-10-CM

## 2020-02-21 PROCEDURE — 90715 TDAP VACCINE 7 YRS/> IM: CPT | Performed by: ADVANCED PRACTICE MIDWIFE

## 2020-02-21 PROCEDURE — 90471 IMMUNIZATION ADMIN: CPT | Performed by: ADVANCED PRACTICE MIDWIFE

## 2020-02-21 NOTE — PROGRESS NOTES
Feeling well, active baby. Denies s/s PTL including UCs, bleeding or LOF. Reviewed 28 week packet. Reviewed warning signs and importance of good FM. Tdap today.

## 2020-02-24 ENCOUNTER — TELEPHONE (OUTPATIENT)
Dept: OBGYN CLINIC | Facility: CLINIC | Age: 25
End: 2020-02-24

## 2020-02-24 NOTE — TELEPHONE ENCOUNTER
Pt notified of overdue 28 wk labs. Pt states last time she saw CNM she was told she can completed labs when she comes in for next appt. PN appt scheduled on 3/6. Pt agreed and voiced understanding.

## 2020-03-04 ENCOUNTER — PATIENT MESSAGE (OUTPATIENT)
Dept: OBGYN CLINIC | Facility: CLINIC | Age: 25
End: 2020-03-04

## 2020-03-04 DIAGNOSIS — Z00.00 LABORATORY EXAMINATION ORDERED AS PART OF A ROUTINE GENERAL MEDICAL EXAMINATION: Primary | ICD-10-CM

## 2020-03-04 NOTE — TELEPHONE ENCOUNTER
From: Ovidio Daniel  To: Ebony Ramos CNM  Sent: 3/4/2020 2:39 AM CST  Subject: Other    Hello,     I will be coming in on Friday. I need two titers Hep B and MMR. Can you put in an order for me?     Thank you,   Ovidio Daniel

## 2020-03-06 ENCOUNTER — LAB ENCOUNTER (OUTPATIENT)
Dept: LAB | Facility: HOSPITAL | Age: 25
End: 2020-03-06
Attending: ADVANCED PRACTICE MIDWIFE
Payer: COMMERCIAL

## 2020-03-06 ENCOUNTER — ROUTINE PRENATAL (OUTPATIENT)
Dept: OBGYN CLINIC | Facility: CLINIC | Age: 25
End: 2020-03-06
Payer: COMMERCIAL

## 2020-03-06 VITALS
HEIGHT: 64 IN | SYSTOLIC BLOOD PRESSURE: 104 MMHG | WEIGHT: 163 LBS | BODY MASS INDEX: 27.83 KG/M2 | DIASTOLIC BLOOD PRESSURE: 66 MMHG | HEART RATE: 75 BPM

## 2020-03-06 DIAGNOSIS — Z00.00 LABORATORY EXAMINATION ORDERED AS PART OF A ROUTINE GENERAL MEDICAL EXAMINATION: ICD-10-CM

## 2020-03-06 DIAGNOSIS — O99.019 ANEMIA DURING PREGNANCY: ICD-10-CM

## 2020-03-06 DIAGNOSIS — Z34.82 ENCOUNTER FOR SUPERVISION OF OTHER NORMAL PREGNANCY IN SECOND TRIMESTER: ICD-10-CM

## 2020-03-06 DIAGNOSIS — Z34.83 PRENATAL CARE, SUBSEQUENT PREGNANCY, THIRD TRIMESTER: Primary | ICD-10-CM

## 2020-03-06 LAB
APPEARANCE: CLEAR
DEPRECATED RDW RBC AUTO: 48.5 FL (ref 35.1–46.3)
ERYTHROCYTE [DISTWIDTH] IN BLOOD BY AUTOMATED COUNT: 19.6 % (ref 11–15)
GLUCOSE 1H P GLC SERPL-MCNC: 128 MG/DL
HBV SURFACE AG SER-ACNC: <0.1 [IU]/L
HBV SURFACE AG SERPL QL IA: NONREACTIVE
HCT VFR BLD AUTO: 30.4 % (ref 35–48)
HGB BLD-MCNC: 9.9 G/DL (ref 12–16)
MCH RBC QN AUTO: 23.4 PG (ref 26–34)
MCHC RBC AUTO-ENTMCNC: 32.6 G/DL (ref 31–37)
MCV RBC AUTO: 71.9 FL (ref 80–100)
MULTISTIX LOT#: ABNORMAL NUMERIC
PH, URINE: 6.5 (ref 4.5–8)
PLATELET # BLD AUTO: 262 10(3)UL (ref 150–450)
RBC # BLD AUTO: 4.23 X10(6)UL (ref 3.8–5.3)
RUBV IGG SER QL: POSITIVE
RUBV IGG SER-ACNC: 135.4 IU/ML (ref 10–?)
SPECIFIC GRAVITY: 1.02 (ref 1–1.03)
URINE-COLOR: YELLOW
UROBILINOGEN,SEMI-QN: 4 MG/DL (ref 0–1.9)
WBC # BLD AUTO: 7.4 X10(3) UL (ref 4–11)

## 2020-03-06 PROCEDURE — 83021 HEMOGLOBIN CHROMOTOGRAPHY: CPT

## 2020-03-06 PROCEDURE — 81002 URINALYSIS NONAUTO W/O SCOPE: CPT | Performed by: ADVANCED PRACTICE MIDWIFE

## 2020-03-06 PROCEDURE — 84466 ASSAY OF TRANSFERRIN: CPT

## 2020-03-06 PROCEDURE — 82950 GLUCOSE TEST: CPT

## 2020-03-06 PROCEDURE — 86765 RUBEOLA ANTIBODY: CPT

## 2020-03-06 PROCEDURE — 83020 HEMOGLOBIN ELECTROPHORESIS: CPT

## 2020-03-06 PROCEDURE — 87340 HEPATITIS B SURFACE AG IA: CPT

## 2020-03-06 PROCEDURE — 86762 RUBELLA ANTIBODY: CPT

## 2020-03-06 PROCEDURE — 83540 ASSAY OF IRON: CPT

## 2020-03-06 PROCEDURE — 87389 HIV-1 AG W/HIV-1&-2 AB AG IA: CPT

## 2020-03-06 PROCEDURE — 82728 ASSAY OF FERRITIN: CPT

## 2020-03-06 PROCEDURE — 85027 COMPLETE CBC AUTOMATED: CPT

## 2020-03-06 PROCEDURE — 86780 TREPONEMA PALLIDUM: CPT

## 2020-03-06 PROCEDURE — 36415 COLL VENOUS BLD VENIPUNCTURE: CPT

## 2020-03-06 NOTE — PROGRESS NOTES
Active fetus  No signs signs of PTL. Reviewed S&S of PTL  Warning signs reviewed  All questions answered. Doing labs after visit.

## 2020-03-07 ENCOUNTER — TELEPHONE (OUTPATIENT)
Dept: OBGYN CLINIC | Facility: CLINIC | Age: 25
End: 2020-03-07

## 2020-03-07 DIAGNOSIS — O99.019 ANEMIA DURING PREGNANCY: Primary | ICD-10-CM

## 2020-03-07 LAB
DEPRECATED HBV CORE AB SER IA-ACNC: 11.4 NG/ML (ref 12–114)
IRON SATURATION: 18 % (ref 15–50)
IRON SERPL-MCNC: 87 UG/DL (ref 50–170)
TOTAL IRON BINDING CAPACITY: 477 UG/DL (ref 240–450)
TRANSFERRIN SERPL-MCNC: 320 MG/DL (ref 200–360)

## 2020-03-07 NOTE — TELEPHONE ENCOUNTER
Pt advised of lab results and MJ's rec's. List of high iron foods mailed to pt. Iron studies added to labs drawn yesterday. Pt agreed and voiced understanding.

## 2020-03-07 NOTE — TELEPHONE ENCOUNTER
----- Message from Kristin Correa CNM sent at 3/6/2020  6:11 PM CST -----  Please notify pt of anemia and to start on iron rid. Order iron studies and plan to repeat CBC in 1 MJ.  Thanks

## 2020-03-09 LAB
MEV IGG SER-ACNC: <5 AU/ML (ref 16.5–?)
T PALLIDUM AB SER QL: NEGATIVE

## 2020-03-10 LAB — MEASLES (RUBEOLA) AB, IGM: 0.17 AU

## 2020-03-11 ENCOUNTER — TELEPHONE (OUTPATIENT)
Dept: OBGYN CLINIC | Facility: CLINIC | Age: 25
End: 2020-03-11

## 2020-03-11 DIAGNOSIS — O99.019 ANEMIA DURING PREGNANCY: Primary | ICD-10-CM

## 2020-03-11 LAB
HGB A2 MFR BLD: 2.5 % (ref 1.5–3.5)
HGB F MFR BLD: 7.2 % (ref 0–2)
HGB PNL BLD ELPH: 90.3 % (ref 95.5–100)

## 2020-03-11 NOTE — TELEPHONE ENCOUNTER
Left detailed message per HARI notifying of lab results and MJ's rec's. Contact information for hematology also left on VM.

## 2020-03-11 NOTE — TELEPHONE ENCOUNTER
----- Message from Aarti Rome CNM sent at 3/11/2020  1:59 PM CDT -----  Pt had abnormal hemoglobin electrophoresis, I would like her to see hematology for further evaluation.  Thanks MJ

## 2020-03-12 ENCOUNTER — TELEPHONE (OUTPATIENT)
Dept: OBGYN CLINIC | Facility: CLINIC | Age: 25
End: 2020-03-12

## 2020-03-27 ENCOUNTER — ROUTINE PRENATAL (OUTPATIENT)
Dept: OBGYN CLINIC | Facility: CLINIC | Age: 25
End: 2020-03-27
Payer: COMMERCIAL

## 2020-03-27 VITALS
SYSTOLIC BLOOD PRESSURE: 109 MMHG | DIASTOLIC BLOOD PRESSURE: 74 MMHG | HEART RATE: 91 BPM | WEIGHT: 162.25 LBS | BODY MASS INDEX: 27.7 KG/M2 | HEIGHT: 64 IN

## 2020-03-27 DIAGNOSIS — Z34.83 PRENATAL CARE, SUBSEQUENT PREGNANCY, THIRD TRIMESTER: Primary | ICD-10-CM

## 2020-03-27 LAB
APPEARANCE: CLEAR
MULTISTIX LOT#: NORMAL NUMERIC
PH, URINE: 7 (ref 4.5–8)
SPECIFIC GRAVITY: 1.01 (ref 1–1.03)
URINE-COLOR: YELLOW
UROBILINOGEN,SEMI-QN: 0.2 MG/DL (ref 0–1.9)

## 2020-03-27 PROCEDURE — 81002 URINALYSIS NONAUTO W/O SCOPE: CPT | Performed by: ADVANCED PRACTICE MIDWIFE

## 2020-03-27 NOTE — PROGRESS NOTES
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 Her parents are staying with her from [facility] for childcare. Baby active. No contractions, feeling more pressure at end of day when working. Recommend support belt. C/O of vaginal discharge with odor.

## 2020-03-29 LAB — TRICHOMONAS SCREEN: NEGATIVE

## 2020-03-31 ENCOUNTER — TELEPHONE (OUTPATIENT)
Dept: OBGYN CLINIC | Facility: CLINIC | Age: 25
End: 2020-03-31

## 2020-03-31 NOTE — TELEPHONE ENCOUNTER
Patricia Joyner,      Please sign off on form: [facility] [place] leave 1-12 wks  -Highlight the patient and hit \"Chart\" button. -In Chart Review, w/in the Encounter tab - click 1 time on the Telephone call encounter for 3/31/2020. Scroll down the telephone encounter.   -

## 2020-04-13 ENCOUNTER — PATIENT MESSAGE (OUTPATIENT)
Dept: OBGYN CLINIC | Facility: CLINIC | Age: 25
End: 2020-04-13

## 2020-04-13 NOTE — TELEPHONE ENCOUNTER
Signed HIPAA/FCR rcvd via [employer]. Faxed completed FMLA to [employer]  [employer].. Attached to pt's mychart.

## 2020-04-14 ENCOUNTER — TELEPHONE (OUTPATIENT)
Dept: OBGYN CLINIC | Facility: CLINIC | Age: 25
End: 2020-04-14

## 2020-04-14 NOTE — TELEPHONE ENCOUNTER
Pt called to talk to Apolonia Velasco pt just needs letter stating due to Covid she should stop working can put in [employer]

## 2020-04-14 NOTE — TELEPHONE ENCOUNTER
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  Thanks KRYSTLE

## 2020-04-14 NOTE — TELEPHONE ENCOUNTER
Notified pt that note was sent via [address] [place] [address] a copy was also mailed to her residence.  Pt verbalized an understanding & agrees w/ plan

## 2020-04-14 NOTE — TELEPHONE ENCOUNTER
Please contact this patient when the letter is done   write a letter that says the CDC supports hcps avoid caring for suspected or positive patients if staffing allows.  As her healthcare provider we recommend that she start her leave for pregnancy now her

## 2020-04-23 ENCOUNTER — ROUTINE PRENATAL (OUTPATIENT)
Dept: OBGYN CLINIC | Facility: CLINIC | Age: 25
End: 2020-04-23
Payer: COMMERCIAL

## 2020-04-23 ENCOUNTER — APPOINTMENT (OUTPATIENT)
Dept: LAB | Facility: HOSPITAL | Age: 25
End: 2020-04-23
Attending: ADVANCED PRACTICE MIDWIFE
Payer: COMMERCIAL

## 2020-04-23 VITALS
BODY MASS INDEX: 29 KG/M2 | DIASTOLIC BLOOD PRESSURE: 68 MMHG | WEIGHT: 169 LBS | HEART RATE: 73 BPM | SYSTOLIC BLOOD PRESSURE: 105 MMHG

## 2020-04-23 DIAGNOSIS — O99.019 ANEMIA DURING PREGNANCY: ICD-10-CM

## 2020-04-23 DIAGNOSIS — Z34.83 ENCOUNTER FOR SUPERVISION OF OTHER NORMAL PREGNANCY IN THIRD TRIMESTER: Primary | ICD-10-CM

## 2020-04-23 PROCEDURE — 85027 COMPLETE CBC AUTOMATED: CPT

## 2020-04-23 PROCEDURE — 36415 COLL VENOUS BLD VENIPUNCTURE: CPT

## 2020-04-23 PROCEDURE — 81002 URINALYSIS NONAUTO W/O SCOPE: CPT | Performed by: ADVANCED PRACTICE MIDWIFE

## 2020-04-23 NOTE — PROGRESS NOTES
Baby active. Denies pain/contractions, LOF or bleeding. Does report more pelvic pressure. GBS obtained. To have CBC repeated today. Denies headaches, vision changes or epigastric pain. Reviewed fetal kick counts.  To count fetal movement during time

## 2020-04-29 ENCOUNTER — ROUTINE PRENATAL (OUTPATIENT)
Dept: OBGYN CLINIC | Facility: CLINIC | Age: 25
End: 2020-04-29
Payer: COMMERCIAL

## 2020-04-29 VITALS
WEIGHT: 172 LBS | SYSTOLIC BLOOD PRESSURE: 123 MMHG | BODY MASS INDEX: 30 KG/M2 | HEART RATE: 91 BPM | DIASTOLIC BLOOD PRESSURE: 74 MMHG

## 2020-04-29 DIAGNOSIS — Z34.83 ENCOUNTER FOR SUPERVISION OF OTHER NORMAL PREGNANCY IN THIRD TRIMESTER: Primary | ICD-10-CM

## 2020-04-29 PROCEDURE — 81002 URINALYSIS NONAUTO W/O SCOPE: CPT | Performed by: ADVANCED PRACTICE MIDWIFE

## 2020-04-29 NOTE — PROGRESS NOTES
Baby active. No SOL. Last baby with CNM's at [facility]. Had SROM went into labor, had unmedicated birth. Plans same this time, plans routine meds, not keeping placenta. Still to decide on Peds. SOL and warning signs reviewed.  She has stopped working and

## 2020-05-08 ENCOUNTER — ROUTINE PRENATAL (OUTPATIENT)
Dept: OBGYN CLINIC | Facility: CLINIC | Age: 25
End: 2020-05-08
Payer: COMMERCIAL

## 2020-05-08 VITALS
HEART RATE: 81 BPM | DIASTOLIC BLOOD PRESSURE: 85 MMHG | HEIGHT: 64 IN | SYSTOLIC BLOOD PRESSURE: 115 MMHG | WEIGHT: 172 LBS | BODY MASS INDEX: 29.37 KG/M2

## 2020-05-08 DIAGNOSIS — Z34.83 PRENATAL CARE, SUBSEQUENT PREGNANCY, THIRD TRIMESTER: Primary | ICD-10-CM

## 2020-05-08 PROCEDURE — 81002 URINALYSIS NONAUTO W/O SCOPE: CPT | Performed by: ADVANCED PRACTICE MIDWIFE

## 2020-05-13 ENCOUNTER — TELEPHONE (OUTPATIENT)
Dept: OBGYN CLINIC | Facility: CLINIC | Age: 25
End: 2020-05-13

## 2020-05-13 NOTE — TELEPHONE ENCOUNTER
Pt spoke to [facility] in January that Pap was coded wrong , Pt then received call from billing they still dont have it coded as Preventive ,

## 2020-05-14 ENCOUNTER — ROUTINE PRENATAL (OUTPATIENT)
Dept: OBGYN CLINIC | Facility: CLINIC | Age: 25
End: 2020-05-14
Payer: COMMERCIAL

## 2020-05-14 VITALS
BODY MASS INDEX: 30.05 KG/M2 | WEIGHT: 176 LBS | HEIGHT: 64 IN | DIASTOLIC BLOOD PRESSURE: 77 MMHG | SYSTOLIC BLOOD PRESSURE: 115 MMHG | HEART RATE: 80 BPM

## 2020-05-14 DIAGNOSIS — Z34.83 PRENATAL CARE, SUBSEQUENT PREGNANCY, THIRD TRIMESTER: Primary | ICD-10-CM

## 2020-05-14 NOTE — PROGRESS NOTES
Feeling well, +FM. Taking COVID precautions. No SOL. Declines membrane sweep. Rev SOL/warnings/when to call.

## 2020-05-18 ENCOUNTER — HOSPITAL ENCOUNTER (OUTPATIENT)
Dept: PERINATAL CARE | Facility: HOSPITAL | Age: 25
Discharge: HOME | End: 2020-05-18
Attending: ADVANCED PRACTICE MIDWIFE
Payer: COMMERCIAL

## 2020-05-18 ENCOUNTER — ROUTINE PRENATAL (OUTPATIENT)
Dept: OBGYN CLINIC | Facility: CLINIC | Age: 25
End: 2020-05-18
Payer: COMMERCIAL

## 2020-05-18 VITALS
DIASTOLIC BLOOD PRESSURE: 75 MMHG | BODY MASS INDEX: 30 KG/M2 | WEIGHT: 176 LBS | HEART RATE: 85 BPM | SYSTOLIC BLOOD PRESSURE: 117 MMHG

## 2020-05-18 VITALS — DIASTOLIC BLOOD PRESSURE: 74 MMHG | SYSTOLIC BLOOD PRESSURE: 122 MMHG | HEART RATE: 81 BPM

## 2020-05-18 DIAGNOSIS — Z87.59 PERSONAL HISTORY OF PREVIOUS POSTDATES PREGNANCY: Primary | ICD-10-CM

## 2020-05-18 DIAGNOSIS — Z34.83 ENCOUNTER FOR SUPERVISION OF OTHER NORMAL PREGNANCY IN THIRD TRIMESTER: Primary | ICD-10-CM

## 2020-05-18 PROCEDURE — 81002 URINALYSIS NONAUTO W/O SCOPE: CPT | Performed by: ADVANCED PRACTICE MIDWIFE

## 2020-05-18 PROCEDURE — 59025 FETAL NON-STRESS TEST: CPT | Performed by: ADVANCED PRACTICE MIDWIFE

## 2020-05-18 NOTE — NST
Nonstress Test   Patient: Kj Rogers    Gestation: 40w1d    NST:       Variability: Moderate           Accelerations: Yes           Decelerations: None            Baseline: 140 BPM           Uterine Irritability: No           Contractions: Irregular

## 2020-05-18 NOTE — PROGRESS NOTES
Feeling well, active baby. Denies SOL including LOF, bleeding or regular UC's. Membrane sweep preformed, to NST today. Reviewed SOL and postdates management. Repeat visit in 1 week.  Reviewed risks vs benefits of EM vs IOL, patient prefers EM until IOL

## 2020-05-18 NOTE — TELEPHONE ENCOUNTER
Request for change placed on BR desk to sign & then need to fax back per email    The physician coding department cannot make changes to labs performed @ the hospital.   In order to make a change, please print the original order, ask the provider to sign a

## 2020-05-20 ENCOUNTER — HOSPITAL ENCOUNTER (INPATIENT)
Facility: HOSPITAL | Age: 25
LOS: 2 days | Discharge: HOME | End: 2020-05-22
Attending: ADVANCED PRACTICE MIDWIFE | Admitting: OBSTETRICS & GYNECOLOGY
Payer: COMMERCIAL

## 2020-05-20 PROBLEM — Z34.90 PREGNANCY: Status: ACTIVE | Noted: 2020-05-20

## 2020-05-20 PROBLEM — Z34.90 PREGNANCY (HCC): Status: ACTIVE | Noted: 2020-05-20

## 2020-05-20 RX ORDER — TRISODIUM CITRATE DIHYDRATE AND CITRIC ACID MONOHYDRATE 500; 334 MG/5ML; MG/5ML
30 SOLUTION ORAL AS NEEDED
Status: DISCONTINUED | OUTPATIENT
Start: 2020-05-20 | End: 2020-05-21 | Stop reason: HOSPADM

## 2020-05-20 RX ORDER — ACETAMINOPHEN 500 MG
500 TABLET ORAL EVERY 6 HOURS PRN
Status: DISCONTINUED | OUTPATIENT
Start: 2020-05-20 | End: 2020-05-21 | Stop reason: HOSPADM

## 2020-05-20 RX ORDER — TERBUTALINE SULFATE 1 MG/ML
0.25 INJECTION, SOLUTION SUBCUTANEOUS AS NEEDED
Status: DISCONTINUED | OUTPATIENT
Start: 2020-05-20 | End: 2020-05-21 | Stop reason: HOSPADM

## 2020-05-20 RX ORDER — AMMONIA INHALANTS 0.04 G/.3ML
0.3 INHALANT RESPIRATORY (INHALATION) AS NEEDED
Status: DISCONTINUED | OUTPATIENT
Start: 2020-05-20 | End: 2020-05-21 | Stop reason: HOSPADM

## 2020-05-20 RX ORDER — ONDANSETRON 2 MG/ML
4 INJECTION INTRAMUSCULAR; INTRAVENOUS EVERY 6 HOURS PRN
Status: DISCONTINUED | OUTPATIENT
Start: 2020-05-20 | End: 2020-05-21 | Stop reason: HOSPADM

## 2020-05-20 RX ORDER — LIDOCAINE HYDROCHLORIDE 10 MG/ML
30 INJECTION, SOLUTION EPIDURAL; INFILTRATION; INTRACAUDAL; PERINEURAL ONCE
Status: DISCONTINUED | OUTPATIENT
Start: 2020-05-21 | End: 2020-05-21 | Stop reason: HOSPADM

## 2020-05-20 RX ORDER — IBUPROFEN 600 MG/1
600 TABLET ORAL EVERY 6 HOURS PRN
Status: DISCONTINUED | OUTPATIENT
Start: 2020-05-20 | End: 2020-05-21 | Stop reason: HOSPADM

## 2020-05-20 NOTE — TELEPHONE ENCOUNTER
Original order printed,  Signed and dated  by BR.    Diagnosis code corrected and faxed to HIM Dept.per request.

## 2020-05-20 NOTE — ADDENDUM NOTE
Encounter addended by: Elisa Rees CNM on: 5/20/2020 4:34 PM   Actions taken: Clinical Note Signed, Charge Capture section accepted

## 2020-05-21 PROCEDURE — 59400 OBSTETRICAL CARE: CPT | Performed by: ADVANCED PRACTICE MIDWIFE

## 2020-05-21 RX ORDER — SIMETHICONE 80 MG
80 TABLET,CHEWABLE ORAL 3 TIMES DAILY PRN
Status: DISCONTINUED | OUTPATIENT
Start: 2020-05-21 | End: 2020-05-22

## 2020-05-21 RX ORDER — IBUPROFEN 600 MG/1
600 TABLET ORAL EVERY 6 HOURS
Status: DISCONTINUED | OUTPATIENT
Start: 2020-05-21 | End: 2020-05-22

## 2020-05-21 RX ORDER — AMMONIA INHALANTS 0.04 G/.3ML
0.3 INHALANT RESPIRATORY (INHALATION) AS NEEDED
Status: DISCONTINUED | OUTPATIENT
Start: 2020-05-21 | End: 2020-05-22

## 2020-05-21 RX ORDER — ONDANSETRON 2 MG/ML
4 INJECTION INTRAMUSCULAR; INTRAVENOUS EVERY 6 HOURS PRN
Status: DISCONTINUED | OUTPATIENT
Start: 2020-05-21 | End: 2020-05-22

## 2020-05-21 RX ORDER — BISACODYL 10 MG
10 SUPPOSITORY, RECTAL RECTAL ONCE AS NEEDED
Status: DISCONTINUED | OUTPATIENT
Start: 2020-05-21 | End: 2020-05-22

## 2020-05-21 RX ORDER — ACETAMINOPHEN 325 MG/1
650 TABLET ORAL EVERY 6 HOURS PRN
Status: DISCONTINUED | OUTPATIENT
Start: 2020-05-21 | End: 2020-05-22

## 2020-05-21 RX ORDER — DIAPER,BRIEF,INFANT-TODD,DISP
1 EACH MISCELLANEOUS EVERY 6 HOURS PRN
Status: DISCONTINUED | OUTPATIENT
Start: 2020-05-21 | End: 2020-05-22

## 2020-05-21 RX ORDER — DOCUSATE SODIUM 100 MG/1
100 CAPSULE, LIQUID FILLED ORAL 2 TIMES DAILY
Status: DISCONTINUED | OUTPATIENT
Start: 2020-05-21 | End: 2020-05-22

## 2020-05-21 NOTE — L&D DELIVERY NOTE
[facility]    Vaginal Delivery Note    Janet Noonan Patient Status:  Inpatient    10/23/1995 MRN [id number]   Location [address]  Attending Mis Hahn, [address] Day # 1 PCP Lauren Jara MD     [facility]

## 2020-05-21 NOTE — LACTATION NOTE
LACTATION NOTE - MOTHER      Evaluation Type: Inpatient    Problems identified  Problems identified: Knowledge deficit    Maternal history  Maternal history: Anemia  Other/comment: alpha thalassemia, HPV    Breastfeeding goal  Breastfeeding goal: To [place]

## 2020-05-21 NOTE — PLAN OF CARE
Received patient into room 365 . Bedside shift report received from [employer]. Patient transferred to bed from wheelchair. Bed in locked and low position. Side rails up X2. Vital signs stable, fundus firm , lochia small, no clots noted.   IV site unremar crying.  - Discuss/demonstrate breast feeding aids (e.g., infant sling, nursing footstool/pillows, and breast pumps). - Encourage mother/other family members to express feelings/concerns, and actively listen.   - Educate father/SO about benefits of breast

## 2020-05-21 NOTE — H&P
[address] Patient Status:  Inpatient    10/23/1995 MRN [id number]   Location [address] [place] [address] Attending Blanche London, [address] Day # 0 PCP Vicky Avila MD     Date of  of breast cancer   • Colon Cancer Neg    • Ovarian Cancer Neg      Social History: Social History    Tobacco Use      Smoking status: Never Smoker      Smokeless tobacco: Never Used    Alcohol use: Not Currently      Comment: occasional       Leoncio GLUUR neg 05/18/2020    NITRITE neg 05/18/2020               Assessment/Plan:    Anthony Perez is at an estimated gestational age of 44w3d with an estimated date of delivery of:  5/17/2020, Date entered prior to episode creation    Early latent labor.

## 2020-05-21 NOTE — PROGRESS NOTES
Patient up to bathroom with assist x 2. Voided 200 mL. Patient transferred to mother/baby room 365 per wheelchair in stable condition with baby and personal belongings. Accompanied by significant other and staff.  Report and bedside handoff given to [facility]

## 2020-05-22 VITALS
HEART RATE: 100 BPM | SYSTOLIC BLOOD PRESSURE: 114 MMHG | HEIGHT: 64 IN | TEMPERATURE: 99 F | RESPIRATION RATE: 16 BRPM | BODY MASS INDEX: 30.05 KG/M2 | WEIGHT: 176 LBS | DIASTOLIC BLOOD PRESSURE: 71 MMHG

## 2020-05-22 PROBLEM — Z34.90 ENCOUNTER FOR SUPERVISION OF NORMAL PREGNANCY: Status: RESOLVED | Noted: 2019-11-26 | Resolved: 2020-05-22

## 2020-05-22 PROBLEM — Z34.90 ENCOUNTER FOR SUPERVISION OF NORMAL PREGNANCY (HCC): Status: RESOLVED | Noted: 2019-11-26 | Resolved: 2020-05-22

## 2020-05-22 NOTE — LACTATION NOTE
LACTATION NOTE - MOTHER      Evaluation Type: Inpatient    Problems identified  Problems identified: Nipple pain;Knowledge deficit  Milk supply not WNL: Reduced (potential)    Maternal history  Maternal history: Anemia  Other/comment: alpha thalassemia, HP

## 2020-05-22 NOTE — PROGRESS NOTES
[facility]    OB/GYNE Progress Note      Juan Ramon Haven Patient Status:  Inpatient    10/23/1995 MRN [id number]   Location [facility] Attending Sagar Harrison, [address] Day # 2 PCP Frida Richter MD        Assessment/Plan 05/21/2020    RH Positive 05/21/2020    WBC 11.6 (H) 05/21/2020    HGB 8.4 (L) 05/21/2020    HCT 25.9 (L) 05/21/2020    .0 05/21/2020    CREATSERUM 0.54 (L) 03/02/2019    BUN 9 03/02/2019     03/02/2019    K 3.8 03/02/2019     03/02/2019

## 2020-05-22 NOTE — PROGRESS NOTES
RN offered patient MMR vaccine per midwife order. Patient declining. Per patient, she will get at outpatient visit.

## 2020-05-22 NOTE — DISCHARGE SUMMARY
[facility] - [facility]    Discharge Summary    Chace Sams Patient Status:  Inpatient    10/23/1995 MRN [id number]   Location [facility] 3SE Attending Zac Mccall, [address] Day # 2       Delivering OB Clinician: Rose Plasencia

## 2020-05-22 NOTE — LACTATION NOTE
This note was copied from a baby's chart. LACTATION NOTE - INFANT    Evaluation Type  Evaluation Type: Inpatient    Problems & Assessment  Problems Diagnosed or Identified:  feeding problem; Shallow latch  Infant Assessment: Hunger cues present; Ski

## 2020-05-26 ENCOUNTER — TELEPHONE (OUTPATIENT)
Dept: OBGYN UNIT | Facility: HOSPITAL | Age: 25
End: 2020-05-26

## 2020-05-26 NOTE — PAYOR COMM NOTE
--------------  DISCHARGE REVIEW    Payor: Dorita [facility]  Subscriber #:  [id number]  Authorization Number: [id number]    Admit date: 5/20/20  Admit time:  2342  Discharge Date: 5/22/2020  5:56 PM     Admitting Physician: Yulia Valdez MD  [place]

## 2020-06-01 ENCOUNTER — PATIENT MESSAGE (OUTPATIENT)
Dept: FAMILY MEDICINE CLINIC | Facility: CLINIC | Age: 25
End: 2020-06-01

## 2020-06-01 NOTE — TELEPHONE ENCOUNTER
From: Marce Walker  To: Lilian Alvarez DO  Sent: 6/1/2020 3:24 PM CDT  Subject: Non-Urgent Nuirka Tan,     I have not seen you in a while because I had a baby and was being seen by the midwives.  I delivered on 5/21/20 and I nee

## 2020-06-05 ENCOUNTER — POSTPARTUM (OUTPATIENT)
Dept: OBGYN CLINIC | Facility: CLINIC | Age: 25
End: 2020-06-05
Payer: COMMERCIAL

## 2020-06-05 VITALS
WEIGHT: 155.19 LBS | BODY MASS INDEX: 27 KG/M2 | HEART RATE: 77 BPM | SYSTOLIC BLOOD PRESSURE: 125 MMHG | DIASTOLIC BLOOD PRESSURE: 83 MMHG

## 2020-06-05 NOTE — PROGRESS NOTES
Tammie Hancock is 2 weeks postpartum after a vaginal delivery of a female infant. See L&D delivery summary for birth statistics. She is without concerns today. Bleeding is decreasing and not experiencing any excessive pain.     Breastfeeding successfully and re

## 2020-06-08 ENCOUNTER — PATIENT MESSAGE (OUTPATIENT)
Dept: OBGYN CLINIC | Facility: CLINIC | Age: 25
End: 2020-06-08

## 2020-06-09 ENCOUNTER — TELEPHONE (OUTPATIENT)
Dept: OBGYN CLINIC | Facility: CLINIC | Age: 25
End: 2020-06-09

## 2020-06-25 ENCOUNTER — TELEPHONE (OUTPATIENT)
Dept: FAMILY MEDICINE CLINIC | Facility: CLINIC | Age: 25
End: 2020-06-25

## 2020-06-25 ENCOUNTER — NURSE ONLY (OUTPATIENT)
Dept: FAMILY MEDICINE CLINIC | Facility: CLINIC | Age: 25
End: 2020-06-25
Payer: COMMERCIAL

## 2020-06-25 DIAGNOSIS — Z23 NEED FOR HEPATITIS B BOOSTER VACCINATION: ICD-10-CM

## 2020-06-25 DIAGNOSIS — Z11.1 SCREENING-PULMONARY TB: Primary | ICD-10-CM

## 2020-06-25 DIAGNOSIS — Z23 NEED FOR MMR VACCINE: Primary | ICD-10-CM

## 2020-06-25 PROCEDURE — 90707 MMR VACCINE SC: CPT | Performed by: FAMILY MEDICINE

## 2020-06-25 PROCEDURE — 90472 IMMUNIZATION ADMIN EACH ADD: CPT | Performed by: FAMILY MEDICINE

## 2020-06-25 PROCEDURE — 90746 HEPB VACCINE 3 DOSE ADULT IM: CPT | Performed by: FAMILY MEDICINE

## 2020-06-25 PROCEDURE — 90471 IMMUNIZATION ADMIN: CPT | Performed by: FAMILY MEDICINE

## 2020-07-02 ENCOUNTER — POSTPARTUM (OUTPATIENT)
Dept: OBGYN CLINIC | Facility: CLINIC | Age: 25
End: 2020-07-02
Payer: COMMERCIAL

## 2020-07-02 ENCOUNTER — APPOINTMENT (OUTPATIENT)
Dept: LAB | Facility: HOSPITAL | Age: 25
End: 2020-07-02
Attending: FAMILY MEDICINE
Payer: COMMERCIAL

## 2020-07-02 VITALS
DIASTOLIC BLOOD PRESSURE: 72 MMHG | HEART RATE: 75 BPM | HEIGHT: 64 IN | BODY MASS INDEX: 26.16 KG/M2 | SYSTOLIC BLOOD PRESSURE: 111 MMHG | WEIGHT: 153.25 LBS

## 2020-07-02 DIAGNOSIS — Z11.1 SCREENING-PULMONARY TB: ICD-10-CM

## 2020-07-02 PROCEDURE — 86480 TB TEST CELL IMMUN MEASURE: CPT

## 2020-07-02 PROCEDURE — 36415 COLL VENOUS BLD VENIPUNCTURE: CPT

## 2020-07-02 NOTE — PROGRESS NOTES
Patient here for postpartum check-up. Vaginal delivery @ 6 weeks ago with SUSANNE DALTON. Breastfeeding exclusively, on demand. Baby with adequate weight gain. Denies fevers, chills, body aches and flu-like symptoms.   Denies abdominal pain, no pelvic pain, repor

## 2020-07-06 LAB
M TB IFN-G CD4+ T-CELLS BLD-ACNC: 0.01 IU/ML
M TB TUBERC IFN-G BLD QL: NEGATIVE
M TB TUBERC IGNF/MITOGEN IGNF CONTROL: 6.59 IU/ML
QUANTIFERON TB1 MINUS NIL: 0 IU/ML
QUANTIFERON TB2 MINUS NIL: 0 IU/ML

## 2020-08-08 ENCOUNTER — NURSE ONLY (OUTPATIENT)
Dept: FAMILY MEDICINE CLINIC | Facility: CLINIC | Age: 25
End: 2020-08-08
Payer: COMMERCIAL

## 2020-08-08 DIAGNOSIS — Z01.84 IMMUNITY TO HEPATITIS B VIRUS DEMONSTRATED BY SEROLOGIC TEST: ICD-10-CM

## 2020-08-08 DIAGNOSIS — Z01.84 IMMUNITY TO MEASLES, MUMPS, AND RUBELLA DETERMINED BY SEROLOGIC TEST: Primary | ICD-10-CM

## 2020-08-08 LAB
HBV SURFACE AB SER QL: REACTIVE
HBV SURFACE AB SERPL IA-ACNC: >1000 MIU/ML

## 2020-08-10 LAB — MEV IGG SER-ACNC: 11.5 AU/ML (ref 16.5–?)

## 2020-08-20 DIAGNOSIS — Z01.84 IMMUNITY STATUS TESTING: Primary | ICD-10-CM

## 2020-08-21 ENCOUNTER — TELEPHONE (OUTPATIENT)
Dept: FAMILY MEDICINE CLINIC | Facility: CLINIC | Age: 25
End: 2020-08-21

## 2020-08-21 NOTE — TELEPHONE ENCOUNTER
Agree with triage advice given but she should call back if any symptoms and we can order through [facility].

## 2020-08-21 NOTE — TELEPHONE ENCOUNTER
Pt states three days ago her spouse tested positive for Covid-19 and she would like to be tested. Pt denies any symptoms at this time.  Advised pt she should self quarantine for 11-14 days, wash hands frequently with warm soap and water for 20 seconds at at

## 2020-08-25 ENCOUNTER — PATIENT MESSAGE (OUTPATIENT)
Dept: FAMILY MEDICINE CLINIC | Facility: CLINIC | Age: 25
End: 2020-08-25

## 2020-08-25 ENCOUNTER — TELEPHONE (OUTPATIENT)
Dept: FAMILY MEDICINE CLINIC | Facility: CLINIC | Age: 25
End: 2020-08-25

## 2020-08-25 NOTE — TELEPHONE ENCOUNTER
From: Prem Osborne  To: Mignon Tello DO  Sent: 8/25/2020 1:37 PM CDT  [facility],     I went to get tested for COVID last Wednesday. I received my results yesterday and tested positive.  How long should I stay [place]

## 2020-08-26 NOTE — TELEPHONE ENCOUNTER
Dr Joy Tucker: Patient contacted. Asking if covid test can be repeated? Tested 8/19/20 and positive for covid. No symptoms now. No fever, no other symptoms. Employer wants a negative test before she can go back to work.    Her 14 day quarantine ends

## 2020-08-26 NOTE — TELEPHONE ENCOUNTER
----- Message from Baron Ho sent at 8/25/2020  1:37 PM CDT -----  Regarding: Test Results Question  Contact: [phone]  Alphonso Smith,     I went to get tested for COVID last Wednesday. I received my results yesterday and tested positive.  How lo

## 2020-08-27 NOTE — TELEPHONE ENCOUNTER
Spoke with patient.  had symptoms and tested positive. She also tested positive on August 19. Had sore throat for few days which has resolved. Pt reports no further symptoms. 10th day is 8/29.   Job is requiring COVID negative test.  Discussed re

## 2021-02-26 ENCOUNTER — TELEPHONE (OUTPATIENT)
Dept: OBGYN CLINIC | Facility: CLINIC | Age: 26
End: 2021-02-26

## 2021-02-26 ENCOUNTER — TELEPHONE (OUTPATIENT)
Dept: FAMILY MEDICINE CLINIC | Facility: CLINIC | Age: 26
End: 2021-02-26

## 2021-02-26 NOTE — TELEPHONE ENCOUNTER
Pt advised CNMs are recommending pregnant and lactating women receive covid vaccine. Pt agreed and voiced understanding.

## 2021-02-26 NOTE — TELEPHONE ENCOUNTER
Patient is currently breast feeding and asking if covid vaccine is safe while breast feeding. Please call at:[phone],thanks.

## 2022-02-14 ENCOUNTER — PATIENT MESSAGE (OUTPATIENT)
Dept: FAMILY MEDICINE CLINIC | Facility: CLINIC | Age: 27
End: 2022-02-14

## 2022-04-13 ENCOUNTER — LAB ENCOUNTER (OUTPATIENT)
Dept: LAB | Facility: HOSPITAL | Age: 27
End: 2022-04-13
Attending: FAMILY MEDICINE
Payer: COMMERCIAL

## 2022-04-13 DIAGNOSIS — E55.9 VITAMIN D DEFICIENCY: ICD-10-CM

## 2022-04-13 DIAGNOSIS — Z00.00 ROUTINE PHYSICAL EXAMINATION: ICD-10-CM

## 2022-04-13 LAB
DEPRECATED RDW RBC AUTO: 44 FL (ref 35.1–46.3)
ERYTHROCYTE [DISTWIDTH] IN BLOOD BY AUTOMATED COUNT: 19.2 % (ref 11–15)
HCT VFR BLD AUTO: 36 %
HGB BLD-MCNC: 11.2 G/DL
MCH RBC QN AUTO: 21.4 PG (ref 26–34)
MCHC RBC AUTO-ENTMCNC: 31.1 G/DL (ref 31–37)
MCV RBC AUTO: 68.8 FL
PLATELET # BLD AUTO: 328 10(3)UL (ref 150–450)
RBC # BLD AUTO: 5.23 X10(6)UL
VIT D+METAB SERPL-MCNC: 16.3 NG/ML (ref 30–100)
WBC # BLD AUTO: 6.5 X10(3) UL (ref 4–11)

## 2022-04-13 PROCEDURE — 82306 VITAMIN D 25 HYDROXY: CPT

## 2022-04-13 PROCEDURE — 85060 BLOOD SMEAR INTERPRETATION: CPT

## 2022-04-13 PROCEDURE — 36415 COLL VENOUS BLD VENIPUNCTURE: CPT

## 2022-04-13 PROCEDURE — 85027 COMPLETE CBC AUTOMATED: CPT

## 2022-04-14 ENCOUNTER — OFFICE VISIT (OUTPATIENT)
Dept: FAMILY MEDICINE CLINIC | Facility: CLINIC | Age: 27
End: 2022-04-14
Payer: COMMERCIAL

## 2022-04-14 VITALS
DIASTOLIC BLOOD PRESSURE: 70 MMHG | SYSTOLIC BLOOD PRESSURE: 114 MMHG | TEMPERATURE: 98 F | WEIGHT: 186 LBS | RESPIRATION RATE: 16 BRPM | HEART RATE: 74 BPM | HEIGHT: 65 IN | BODY MASS INDEX: 30.99 KG/M2

## 2022-04-14 DIAGNOSIS — Z00.00 ROUTINE PHYSICAL EXAMINATION: Primary | ICD-10-CM

## 2022-04-14 DIAGNOSIS — D64.9 ANEMIA, UNSPECIFIED TYPE: ICD-10-CM

## 2022-04-14 DIAGNOSIS — E55.9 VITAMIN D DEFICIENCY: ICD-10-CM

## 2022-04-14 PROCEDURE — 3078F DIAST BP <80 MM HG: CPT | Performed by: FAMILY MEDICINE

## 2022-04-14 PROCEDURE — 3008F BODY MASS INDEX DOCD: CPT | Performed by: FAMILY MEDICINE

## 2022-04-14 PROCEDURE — 3074F SYST BP LT 130 MM HG: CPT | Performed by: FAMILY MEDICINE

## 2022-04-14 PROCEDURE — 99395 PREV VISIT EST AGE 18-39: CPT | Performed by: FAMILY MEDICINE

## 2022-04-14 RX ORDER — ERGOCALCIFEROL 1.25 MG/1
50000 CAPSULE ORAL WEEKLY
Qty: 8 CAPSULE | Refills: 0 | Status: SHIPPED | OUTPATIENT
Start: 2022-04-14 | End: 2022-05-14

## 2022-04-15 RX ORDER — ERGOCALCIFEROL 1.25 MG/1
CAPSULE ORAL
Qty: 12 CAPSULE | Refills: 0 | OUTPATIENT
Start: 2022-04-15

## 2022-05-15 RX ORDER — ERGOCALCIFEROL 1.25 MG/1
50000 CAPSULE ORAL WEEKLY
Qty: 8 CAPSULE | Refills: 0 | Status: SHIPPED | OUTPATIENT
Start: 2022-05-15 | End: 2022-06-14

## 2022-08-23 ENCOUNTER — OFFICE VISIT (OUTPATIENT)
Dept: OBGYN CLINIC | Facility: CLINIC | Age: 27
End: 2022-08-23
Payer: COMMERCIAL

## 2022-08-23 VITALS
BODY MASS INDEX: 31.65 KG/M2 | SYSTOLIC BLOOD PRESSURE: 103 MMHG | WEIGHT: 190 LBS | HEART RATE: 73 BPM | DIASTOLIC BLOOD PRESSURE: 70 MMHG | HEIGHT: 65 IN

## 2022-08-23 DIAGNOSIS — N92.6 MISSED MENSES: Primary | ICD-10-CM

## 2022-08-23 PROCEDURE — 3008F BODY MASS INDEX DOCD: CPT | Performed by: ADVANCED PRACTICE MIDWIFE

## 2022-08-23 PROCEDURE — 3078F DIAST BP <80 MM HG: CPT | Performed by: ADVANCED PRACTICE MIDWIFE

## 2022-08-23 PROCEDURE — 3074F SYST BP LT 130 MM HG: CPT | Performed by: ADVANCED PRACTICE MIDWIFE

## 2022-08-23 PROCEDURE — 99214 OFFICE O/P EST MOD 30 MIN: CPT | Performed by: ADVANCED PRACTICE MIDWIFE

## 2022-09-14 ENCOUNTER — NURSE ONLY (OUTPATIENT)
Dept: OBGYN CLINIC | Facility: CLINIC | Age: 27
End: 2022-09-14
Payer: COMMERCIAL

## 2022-09-14 VITALS — WEIGHT: 182 LBS | BODY MASS INDEX: 30 KG/M2

## 2022-09-14 DIAGNOSIS — Z34.80 PRENATAL CARE OF MULTIGRAVIDA, ANTEPARTUM: Primary | ICD-10-CM

## 2022-09-14 DIAGNOSIS — D56.0 ALPHA THALASSEMIA (HCC): ICD-10-CM

## 2022-09-14 NOTE — PROGRESS NOTES
Nurse education complete via phone visit. Pt instructed to review folder & handouts. Pt is a known carrier of alpha thalassemia. Never had chicken pox but received the varicella vaccine. vegetarian. Prepreg BMI 31. HA1C, ordered w/ NOB labs. Declines Nutrition Consult. Hx of abnormal paps. Last pap results in Epic. Pt to complete EPDS & BELTRAN at next visit NOB appt scheduled. Pt desires waterbirth    Pt. Has answered NO 5P questions and has NO  risk factors. Pt. Given What pregnant women need to know handout. Pt counseled on ACOG & ACNM guidelines recommending carrier testing. Carrier Testing, including Hemoglobin Evaluation offered through pt & insurance choice of [facility] or [employer].  Pt declines testing

## 2022-09-15 ENCOUNTER — NURSE TRIAGE (OUTPATIENT)
Dept: FAMILY MEDICINE CLINIC | Facility: CLINIC | Age: 27
End: 2022-09-15

## 2022-10-01 ENCOUNTER — LAB ENCOUNTER (OUTPATIENT)
Dept: LAB | Facility: HOSPITAL | Age: 27
End: 2022-10-01
Attending: CLINICAL NURSE SPECIALIST
Payer: COMMERCIAL

## 2022-10-01 DIAGNOSIS — Z34.80 PRENATAL CARE OF MULTIGRAVIDA, ANTEPARTUM: ICD-10-CM

## 2022-10-01 DIAGNOSIS — D56.0 ALPHA THALASSEMIA (HCC): ICD-10-CM

## 2022-10-01 LAB
ANTIBODY SCREEN: NEGATIVE
BASOPHILS # BLD AUTO: 0.03 X10(3) UL (ref 0–0.2)
BASOPHILS NFR BLD AUTO: 0.4 %
DEPRECATED HBV CORE AB SER IA-ACNC: 38.6 NG/ML
DEPRECATED RDW RBC AUTO: 46.2 FL (ref 35.1–46.3)
EOSINOPHIL # BLD AUTO: 0.05 X10(3) UL (ref 0–0.7)
EOSINOPHIL NFR BLD AUTO: 0.7 %
ERYTHROCYTE [DISTWIDTH] IN BLOOD BY AUTOMATED COUNT: 19.4 % (ref 11–15)
EST. AVERAGE GLUCOSE BLD GHB EST-MCNC: 103 MG/DL (ref 68–126)
HBA1C MFR BLD: 5.2 % (ref ?–5.7)
HBV SURFACE AG SER-ACNC: 0.1 [IU]/L
HBV SURFACE AG SERPL QL IA: NONREACTIVE
HCT VFR BLD AUTO: 33.2 %
HCV AB SERPL QL IA: NONREACTIVE
HGB BLD-MCNC: 10.5 G/DL
IMM GRANULOCYTES # BLD AUTO: 0.03 X10(3) UL (ref 0–1)
IMM GRANULOCYTES NFR BLD: 0.4 %
LYMPHOCYTES # BLD AUTO: 1.21 X10(3) UL (ref 1–4)
LYMPHOCYTES NFR BLD AUTO: 17.6 %
MCH RBC QN AUTO: 21.8 PG (ref 26–34)
MCHC RBC AUTO-ENTMCNC: 31.6 G/DL (ref 31–37)
MCV RBC AUTO: 68.9 FL
MONOCYTES # BLD AUTO: 0.37 X10(3) UL (ref 0.1–1)
MONOCYTES NFR BLD AUTO: 5.4 %
NEUTROPHILS # BLD AUTO: 5.17 X10 (3) UL (ref 1.5–7.7)
NEUTROPHILS # BLD AUTO: 5.17 X10(3) UL (ref 1.5–7.7)
NEUTROPHILS NFR BLD AUTO: 75.5 %
PLATELET # BLD AUTO: 305 10(3)UL (ref 150–450)
RBC # BLD AUTO: 4.82 X10(6)UL
RH BLOOD TYPE: POSITIVE
RUBV IGG SER QL: POSITIVE
RUBV IGG SER-ACNC: 118.9 IU/ML (ref 10–?)
WBC # BLD AUTO: 6.9 X10(3) UL (ref 4–11)

## 2022-10-01 PROCEDURE — 86900 BLOOD TYPING SEROLOGIC ABO: CPT

## 2022-10-01 PROCEDURE — 85025 COMPLETE CBC W/AUTO DIFF WBC: CPT

## 2022-10-01 PROCEDURE — 86780 TREPONEMA PALLIDUM: CPT

## 2022-10-01 PROCEDURE — 36415 COLL VENOUS BLD VENIPUNCTURE: CPT

## 2022-10-01 PROCEDURE — 82728 ASSAY OF FERRITIN: CPT

## 2022-10-01 PROCEDURE — 87340 HEPATITIS B SURFACE AG IA: CPT

## 2022-10-01 PROCEDURE — 87389 HIV-1 AG W/HIV-1&-2 AB AG IA: CPT

## 2022-10-01 PROCEDURE — 86803 HEPATITIS C AB TEST: CPT

## 2022-10-01 PROCEDURE — 83036 HEMOGLOBIN GLYCOSYLATED A1C: CPT

## 2022-10-01 PROCEDURE — 87086 URINE CULTURE/COLONY COUNT: CPT

## 2022-10-01 PROCEDURE — 86762 RUBELLA ANTIBODY: CPT

## 2022-10-01 PROCEDURE — 86901 BLOOD TYPING SEROLOGIC RH(D): CPT

## 2022-10-01 PROCEDURE — 86850 RBC ANTIBODY SCREEN: CPT

## 2022-10-03 LAB — T PALLIDUM AB SER QL: NEGATIVE

## 2022-10-05 ENCOUNTER — INITIAL PRENATAL (OUTPATIENT)
Dept: OBGYN CLINIC | Facility: CLINIC | Age: 27
End: 2022-10-05
Payer: COMMERCIAL

## 2022-10-05 VITALS
SYSTOLIC BLOOD PRESSURE: 102 MMHG | WEIGHT: 182 LBS | DIASTOLIC BLOOD PRESSURE: 67 MMHG | HEART RATE: 81 BPM | BODY MASS INDEX: 30 KG/M2

## 2022-10-05 DIAGNOSIS — N39.3 URINARY, INCONTINENCE, STRESS FEMALE: ICD-10-CM

## 2022-10-05 DIAGNOSIS — Z11.3 SCREEN FOR STD (SEXUALLY TRANSMITTED DISEASE): Primary | ICD-10-CM

## 2022-10-05 DIAGNOSIS — Z34.80 PRENATAL CARE OF MULTIGRAVIDA, ANTEPARTUM: ICD-10-CM

## 2022-10-05 DIAGNOSIS — R87.612 PAPANICOLAOU SMEAR OF CERVIX WITH LOW GRADE SQUAMOUS INTRAEPITHELIAL LESION (LGSIL): ICD-10-CM

## 2022-10-05 DIAGNOSIS — N63.10 LARGE MASS OF RIGHT BREAST: ICD-10-CM

## 2022-10-05 PROBLEM — N63.41 SUBAREOLAR MASS OF RIGHT BREAST: Status: ACTIVE | Noted: 2022-10-05

## 2022-10-05 PROBLEM — O99.210 OBESITY IN PREGNANCY (HCC): Status: ACTIVE | Noted: 2022-10-05

## 2022-10-05 PROBLEM — Z86.2 HISTORY OF HYPOCHROMIC MICROCYTIC ANEMIA: Status: ACTIVE | Noted: 2019-10-08

## 2022-10-05 PROBLEM — O99.210 OBESITY IN PREGNANCY: Status: ACTIVE | Noted: 2022-10-05

## 2022-10-05 PROCEDURE — 3078F DIAST BP <80 MM HG: CPT | Performed by: ADVANCED PRACTICE MIDWIFE

## 2022-10-05 PROCEDURE — 3074F SYST BP LT 130 MM HG: CPT | Performed by: ADVANCED PRACTICE MIDWIFE

## 2022-10-05 NOTE — PROGRESS NOTES
[place], [place] [id number], is at [address] [place] [address], here for her NOB visit. Currently, she is feeling well. Denies cramping, bleeding. Urinary incontinence when vomitting. C/o Nausea and vomiting, daily every morning or if eats something irritating. Able to keep food down later in the day. Has not tried any meds. Just taking PNV and iron. Vital signs and weight reviewed  See flowsheets  Prenatal Physical  COVID shot status:vaccinated and boosted    Patient Active Problem List:     Papanicolaou smear of cervix with low grade squamous intraepithelial lesion (LGSIL)     Microcytic anemia     Alpha thalassaemia minor     Pregnancy     Obesity in pregnancy       Today's Assessment/Plan:   12.3 wk IUP  N/V of pregnancy  Alpha thalasemia  Obestiy    Return in about 4 weeks (around 11/2/2022). Reviewed:   Prenatal visit schedule  COVID-19 precautions and CDC guidelines for pregnant women in pregnancy. See AVS  CNM care  Emergency contact info and safe use of messaging in MyChart  1st  trimester precautions and expectations  Nutrition, safety in food, medications, and exercise, and Toxoplasmosis  Intimate Partner Violence screening- denies  Danger signs  Current L&D policies: Three visitors plus cecilia  COVID-19 screening  Nitrous and waterbirth available    Pt verbalized understanding. All questions answered.  No barriers to learning identified    Tye Chicas CNM

## 2022-10-06 LAB
C TRACH DNA SPEC QL NAA+PROBE: NEGATIVE
N GONORRHOEA DNA SPEC QL NAA+PROBE: NEGATIVE

## 2022-10-12 ENCOUNTER — HOSPITAL ENCOUNTER (OUTPATIENT)
Dept: ULTRASOUND IMAGING | Age: 27
Discharge: HOME | End: 2022-10-12
Attending: ADVANCED PRACTICE MIDWIFE
Payer: COMMERCIAL

## 2022-10-12 DIAGNOSIS — N92.6 MISSED MENSES: ICD-10-CM

## 2022-10-12 PROCEDURE — 76801 OB US < 14 WKS SINGLE FETUS: CPT | Performed by: ADVANCED PRACTICE MIDWIFE

## 2022-11-01 ENCOUNTER — ROUTINE PRENATAL (OUTPATIENT)
Dept: OBGYN CLINIC | Facility: CLINIC | Age: 27
End: 2022-11-01
Payer: COMMERCIAL

## 2022-11-01 VITALS
DIASTOLIC BLOOD PRESSURE: 65 MMHG | HEART RATE: 96 BPM | SYSTOLIC BLOOD PRESSURE: 98 MMHG | WEIGHT: 183 LBS | BODY MASS INDEX: 30 KG/M2

## 2022-11-01 DIAGNOSIS — Z34.82 ENCOUNTER FOR SUPERVISION OF OTHER NORMAL PREGNANCY IN SECOND TRIMESTER: Primary | ICD-10-CM

## 2022-11-01 DIAGNOSIS — Z28.21 INFLUENZA VACCINE REFUSED: ICD-10-CM

## 2022-11-01 PROCEDURE — 3074F SYST BP LT 130 MM HG: CPT | Performed by: ADVANCED PRACTICE MIDWIFE

## 2022-11-01 PROCEDURE — 3078F DIAST BP <80 MM HG: CPT | Performed by: ADVANCED PRACTICE MIDWIFE

## 2022-11-01 NOTE — PROGRESS NOTES
Glo Clayton is doing well overall, she is still feeling nauseous daily, vomiting improving to once every other day. TWG -6 lbs (up one lb from last visit). Taking prenatal and iron daily. She denies cramping or vaginal bleeding. She has her R breast ultrasound scheduled for 11/7/22. She declines flu vaccine and genetic testing today. Level II ultrasound ordered for 20 weeks. Return in 4 weeks for next visit. I was present with [facility] student and examined pt as well and agree with assessment and plan as per above. Warning signs reviewed. This is another girl.  Leesa Cason

## 2022-11-07 ENCOUNTER — HOSPITAL ENCOUNTER (OUTPATIENT)
Dept: ULTRASOUND IMAGING | Facility: HOSPITAL | Age: 27
Discharge: HOME | End: 2022-11-07
Attending: ADVANCED PRACTICE MIDWIFE
Payer: COMMERCIAL

## 2022-11-07 DIAGNOSIS — N63.10 LARGE MASS OF RIGHT BREAST: ICD-10-CM

## 2022-11-07 PROCEDURE — 76642 ULTRASOUND BREAST LIMITED: CPT | Performed by: ADVANCED PRACTICE MIDWIFE

## 2022-12-05 ENCOUNTER — ROUTINE PRENATAL (OUTPATIENT)
Dept: OBGYN CLINIC | Facility: CLINIC | Age: 27
End: 2022-12-05
Payer: COMMERCIAL

## 2022-12-05 VITALS
WEIGHT: 183 LBS | HEART RATE: 81 BPM | DIASTOLIC BLOOD PRESSURE: 70 MMHG | BODY MASS INDEX: 30 KG/M2 | SYSTOLIC BLOOD PRESSURE: 107 MMHG

## 2022-12-05 DIAGNOSIS — Z34.82 ENCOUNTER FOR SUPERVISION OF OTHER NORMAL PREGNANCY IN SECOND TRIMESTER: Primary | ICD-10-CM

## 2022-12-05 DIAGNOSIS — O99.210 OBESITY IN PREGNANCY: ICD-10-CM

## 2022-12-05 NOTE — PROGRESS NOTES
Feeling well. Has started feeling FM. Denies vaginal bleeding, LOF, pelvic pain. Has not scheduled anatomy scan as she states she called a week ago and they did not have order. Placed order today. Instructed to call asap. Reviewed warning signs and when to call.  FEDERICO 4wks

## 2023-01-09 ENCOUNTER — HOSPITAL ENCOUNTER (OUTPATIENT)
Dept: PERINATAL CARE | Facility: HOSPITAL | Age: 28
Discharge: HOME | End: 2023-01-09
Attending: OBSTETRICS & GYNECOLOGY
Payer: COMMERCIAL

## 2023-01-09 ENCOUNTER — HOSPITAL ENCOUNTER (OUTPATIENT)
Dept: PERINATAL CARE | Facility: HOSPITAL | Age: 28
End: 2023-01-09
Attending: ADVANCED PRACTICE MIDWIFE
Payer: COMMERCIAL

## 2023-01-09 VITALS
DIASTOLIC BLOOD PRESSURE: 64 MMHG | BODY MASS INDEX: 31 KG/M2 | SYSTOLIC BLOOD PRESSURE: 100 MMHG | WEIGHT: 185 LBS | HEART RATE: 90 BPM

## 2023-01-09 DIAGNOSIS — E66.9 CLASS 1 OBESITY WITHOUT SERIOUS COMORBIDITY WITH BODY MASS INDEX (BMI) OF 31.0 TO 31.9 IN ADULT, UNSPECIFIED OBESITY TYPE: ICD-10-CM

## 2023-01-09 DIAGNOSIS — D56.3 ALPHA THALASSAEMIA MINOR: ICD-10-CM

## 2023-01-09 DIAGNOSIS — O99.210 OBESITY IN PREGNANCY: ICD-10-CM

## 2023-01-09 DIAGNOSIS — O99.210 OBESITY IN PREGNANCY: Primary | ICD-10-CM

## 2023-01-09 DIAGNOSIS — O99.212 OBESITY AFFECTING PREGNANCY IN SECOND TRIMESTER: ICD-10-CM

## 2023-01-09 PROBLEM — E66.811 CLASS 1 OBESITY WITHOUT SERIOUS COMORBIDITY WITH BODY MASS INDEX (BMI) OF 31.0 TO 31.9 IN ADULT: Status: ACTIVE | Noted: 2022-10-05

## 2023-01-09 PROCEDURE — 76811 OB US DETAILED SNGL FETUS: CPT | Performed by: OBSTETRICS & GYNECOLOGY

## 2023-01-18 ENCOUNTER — ROUTINE PRENATAL (OUTPATIENT)
Dept: OBGYN CLINIC | Facility: CLINIC | Age: 28
End: 2023-01-18

## 2023-01-18 VITALS
BODY MASS INDEX: 31 KG/M2 | HEART RATE: 81 BPM | WEIGHT: 189 LBS | DIASTOLIC BLOOD PRESSURE: 70 MMHG | SYSTOLIC BLOOD PRESSURE: 107 MMHG

## 2023-01-18 DIAGNOSIS — Z34.83 ENCOUNTER FOR SUPERVISION OF OTHER NORMAL PREGNANCY IN THIRD TRIMESTER: Primary | ICD-10-CM

## 2023-01-18 PROCEDURE — 3078F DIAST BP <80 MM HG: CPT | Performed by: ADVANCED PRACTICE MIDWIFE

## 2023-01-18 PROCEDURE — 3074F SYST BP LT 130 MM HG: CPT | Performed by: ADVANCED PRACTICE MIDWIFE

## 2023-01-18 NOTE — PROGRESS NOTES
Active fetus Denies any complaints. Denies any vaginal bleeding, leaking of fluid or vaginal discharge. Warning signs reviewed  All questions answered. Pt reports hgb is usually low due to having alpha thalassemia, states iron supp does not usually raise her hgb. Will check CBC next week, as patient cannot stay today with daughter. Will complete 3T labs, 1hr GTT, and Tdap at next visit. 26 week US reviewed and WNL. Will need 32 weeks growth US and BPP scheduled for obesity per M. Return to clinic in 1 week.

## 2023-01-26 ENCOUNTER — LAB ENCOUNTER (OUTPATIENT)
Dept: LAB | Facility: REFERENCE LAB | Age: 28
End: 2023-01-26
Attending: ADVANCED PRACTICE MIDWIFE
Payer: COMMERCIAL

## 2023-01-26 DIAGNOSIS — Z34.82 ENCOUNTER FOR SUPERVISION OF OTHER NORMAL PREGNANCY IN SECOND TRIMESTER: ICD-10-CM

## 2023-01-26 PROBLEM — O99.019 ANEMIA IN PREGNANCY: Status: ACTIVE | Noted: 2023-01-26

## 2023-01-26 PROBLEM — O99.019 ANEMIA IN PREGNANCY (HCC): Status: ACTIVE | Noted: 2023-01-26

## 2023-01-26 LAB
DEPRECATED RDW RBC AUTO: 46.6 FL (ref 35.1–46.3)
ERYTHROCYTE [DISTWIDTH] IN BLOOD BY AUTOMATED COUNT: 19.3 % (ref 11–15)
GLUCOSE 1H P GLC SERPL-MCNC: 190 MG/DL
HCT VFR BLD AUTO: 30.7 %
HGB BLD-MCNC: 9.6 G/DL
MCH RBC QN AUTO: 21.8 PG (ref 26–34)
MCHC RBC AUTO-ENTMCNC: 31.3 G/DL (ref 31–37)
MCV RBC AUTO: 69.8 FL
PLATELET # BLD AUTO: 301 10(3)UL (ref 150–450)
RBC # BLD AUTO: 4.4 X10(6)UL
WBC # BLD AUTO: 6.8 X10(3) UL (ref 4–11)

## 2023-01-26 PROCEDURE — 87389 HIV-1 AG W/HIV-1&-2 AB AG IA: CPT

## 2023-01-26 PROCEDURE — 82950 GLUCOSE TEST: CPT

## 2023-01-26 PROCEDURE — 36415 COLL VENOUS BLD VENIPUNCTURE: CPT

## 2023-01-26 PROCEDURE — 85027 COMPLETE CBC AUTOMATED: CPT

## 2023-01-26 PROCEDURE — 86780 TREPONEMA PALLIDUM: CPT

## 2023-01-27 LAB — T PALLIDUM AB SER QL: NEGATIVE

## 2023-01-30 ENCOUNTER — TELEPHONE (OUTPATIENT)
Dept: OBGYN CLINIC | Facility: CLINIC | Age: 28
End: 2023-01-30

## 2023-01-30 DIAGNOSIS — O99.810 GLUCOSE INTOLERANCE OF PREGNANCY: Primary | ICD-10-CM

## 2023-01-30 NOTE — TELEPHONE ENCOUNTER
----- Message from Agnieszka Mathews CNM sent at 1/26/2023 11:44 PM CST -----  Please call patient. 1hr elevated. Needs 3hr. She is also anemic. I believe she is already taking oral iron so I would like IV iron ordered. Thanks!

## 2023-01-30 NOTE — TELEPHONE ENCOUNTER
Spoke with pt advised of lab results and CECIL's rec's. Orders entered for 3hr gtt and HA1C, instructions reviewed with pt. IV iron order form filled out and faxed. Pt agreed and voiced understanding.

## 2023-02-02 PROBLEM — O99.019 ANEMIA OF PREGNANCY: Status: ACTIVE | Noted: 2023-02-02

## 2023-02-02 PROBLEM — O99.019 ANEMIA OF PREGNANCY (HCC): Status: ACTIVE | Noted: 2023-02-02

## 2023-02-03 ENCOUNTER — LABORATORY ENCOUNTER (OUTPATIENT)
Dept: LAB | Facility: HOSPITAL | Age: 28
End: 2023-02-03
Attending: ADVANCED PRACTICE MIDWIFE
Payer: COMMERCIAL

## 2023-02-03 ENCOUNTER — ROUTINE PRENATAL (OUTPATIENT)
Dept: OBGYN CLINIC | Facility: CLINIC | Age: 28
End: 2023-02-03

## 2023-02-03 VITALS
DIASTOLIC BLOOD PRESSURE: 70 MMHG | SYSTOLIC BLOOD PRESSURE: 108 MMHG | HEART RATE: 85 BPM | BODY MASS INDEX: 31 KG/M2 | WEIGHT: 184 LBS

## 2023-02-03 DIAGNOSIS — Z23 NEED FOR VACCINATION: ICD-10-CM

## 2023-02-03 DIAGNOSIS — O99.810 GLUCOSE INTOLERANCE OF PREGNANCY: ICD-10-CM

## 2023-02-03 DIAGNOSIS — Z34.83 ENCOUNTER FOR SUPERVISION OF OTHER NORMAL PREGNANCY IN THIRD TRIMESTER: Primary | ICD-10-CM

## 2023-02-03 DIAGNOSIS — D56.3 ALPHA THALASSAEMIA MINOR: ICD-10-CM

## 2023-02-03 DIAGNOSIS — O99.013 ANEMIA DURING PREGNANCY IN THIRD TRIMESTER: ICD-10-CM

## 2023-02-03 LAB
EST. AVERAGE GLUCOSE BLD GHB EST-MCNC: 103 MG/DL (ref 68–126)
GLUCOSE 1H P GLC SERPL-MCNC: 147 MG/DL
GLUCOSE 2H P GLC SERPL-MCNC: 126 MG/DL
GLUCOSE 3H P GLC SERPL-MCNC: 93 MG/DL (ref 70–140)
GLUCOSE P FAST SERPL-MCNC: 83 MG/DL
HBA1C MFR BLD: 5.2 % (ref ?–5.7)

## 2023-02-03 PROCEDURE — 82951 GLUCOSE TOLERANCE TEST (GTT): CPT

## 2023-02-03 PROCEDURE — 3074F SYST BP LT 130 MM HG: CPT | Performed by: ADVANCED PRACTICE MIDWIFE

## 2023-02-03 PROCEDURE — 83036 HEMOGLOBIN GLYCOSYLATED A1C: CPT

## 2023-02-03 PROCEDURE — 90471 IMMUNIZATION ADMIN: CPT | Performed by: ADVANCED PRACTICE MIDWIFE

## 2023-02-03 PROCEDURE — 36415 COLL VENOUS BLD VENIPUNCTURE: CPT

## 2023-02-03 PROCEDURE — 82952 GTT-ADDED SAMPLES: CPT

## 2023-02-03 PROCEDURE — 3078F DIAST BP <80 MM HG: CPT | Performed by: ADVANCED PRACTICE MIDWIFE

## 2023-02-03 PROCEDURE — 90715 TDAP VACCINE 7 YRS/> IM: CPT | Performed by: ADVANCED PRACTICE MIDWIFE

## 2023-02-03 NOTE — PROGRESS NOTES
Feeling well. Endorses regular fetal movement. Denies contractions, LOF, vaginal bleeding. Completed 3hr prior to visit. Not fully resulted yet. Discussed anemia and placed referral to hematology due to alpha thalassemia and anemia. Recommend IV iron at this time but plan may change based on hematology recommendation. TDAP given today. Reviewed warning signs and when to call.  FEDERICO 2 wks

## 2023-02-21 ENCOUNTER — ROUTINE PRENATAL (OUTPATIENT)
Dept: OBGYN CLINIC | Facility: CLINIC | Age: 28
End: 2023-02-21

## 2023-02-21 ENCOUNTER — LAB ENCOUNTER (OUTPATIENT)
Dept: LAB | Facility: HOSPITAL | Age: 28
End: 2023-02-21
Attending: ADVANCED PRACTICE MIDWIFE
Payer: COMMERCIAL

## 2023-02-21 VITALS
WEIGHT: 188 LBS | HEART RATE: 101 BPM | DIASTOLIC BLOOD PRESSURE: 77 MMHG | SYSTOLIC BLOOD PRESSURE: 118 MMHG | BODY MASS INDEX: 31 KG/M2

## 2023-02-21 DIAGNOSIS — O99.013 ANEMIA IN PREGNANCY, THIRD TRIMESTER: ICD-10-CM

## 2023-02-21 DIAGNOSIS — Z34.83 ENCOUNTER FOR SUPERVISION OF OTHER NORMAL PREGNANCY IN THIRD TRIMESTER: Primary | ICD-10-CM

## 2023-02-21 LAB
DEPRECATED HBV CORE AB SER IA-ACNC: 6.4 NG/ML
DEPRECATED RDW RBC AUTO: 43.2 FL (ref 35.1–46.3)
ERYTHROCYTE [DISTWIDTH] IN BLOOD BY AUTOMATED COUNT: 18 % (ref 11–15)
HCT VFR BLD AUTO: 30.3 %
HGB BLD-MCNC: 9.6 G/DL
IRON SATN MFR SERPL: 8 %
IRON SERPL-MCNC: 45 UG/DL
MCH RBC QN AUTO: 21.8 PG (ref 26–34)
MCHC RBC AUTO-ENTMCNC: 31.7 G/DL (ref 31–37)
MCV RBC AUTO: 68.7 FL
PLATELET # BLD AUTO: 345 10(3)UL (ref 150–450)
RBC # BLD AUTO: 4.41 X10(6)UL
TIBC SERPL-MCNC: 538 UG/DL (ref 240–450)
TRANSFERRIN SERPL-MCNC: 361 MG/DL (ref 200–360)
WBC # BLD AUTO: 8.3 X10(3) UL (ref 4–11)

## 2023-02-21 PROCEDURE — 82728 ASSAY OF FERRITIN: CPT

## 2023-02-21 PROCEDURE — 36415 COLL VENOUS BLD VENIPUNCTURE: CPT

## 2023-02-21 PROCEDURE — 3074F SYST BP LT 130 MM HG: CPT | Performed by: ADVANCED PRACTICE MIDWIFE

## 2023-02-21 PROCEDURE — 83540 ASSAY OF IRON: CPT

## 2023-02-21 PROCEDURE — 3078F DIAST BP <80 MM HG: CPT | Performed by: ADVANCED PRACTICE MIDWIFE

## 2023-02-21 PROCEDURE — 84466 ASSAY OF TRANSFERRIN: CPT

## 2023-02-21 PROCEDURE — 85027 COMPLETE CBC AUTOMATED: CPT

## 2023-02-22 ENCOUNTER — HOSPITAL ENCOUNTER (OUTPATIENT)
Dept: PERINATAL CARE | Facility: HOSPITAL | Age: 28
Discharge: HOME | End: 2023-02-22
Attending: OBSTETRICS & GYNECOLOGY
Payer: COMMERCIAL

## 2023-02-22 VITALS
DIASTOLIC BLOOD PRESSURE: 72 MMHG | SYSTOLIC BLOOD PRESSURE: 108 MMHG | HEART RATE: 85 BPM | BODY MASS INDEX: 31 KG/M2 | WEIGHT: 188 LBS

## 2023-02-22 DIAGNOSIS — O99.210 OBESITY IN PREGNANCY: ICD-10-CM

## 2023-02-22 DIAGNOSIS — O99.210 OBESITY IN PREGNANCY: Primary | ICD-10-CM

## 2023-02-22 DIAGNOSIS — D56.3 ALPHA THALASSAEMIA MINOR: ICD-10-CM

## 2023-02-22 PROCEDURE — 76819 FETAL BIOPHYS PROFIL W/O NST: CPT

## 2023-02-22 PROCEDURE — 76816 OB US FOLLOW-UP PER FETUS: CPT | Performed by: OBSTETRICS & GYNECOLOGY

## 2023-02-22 NOTE — PROGRESS NOTES
Baby active. No contractions, LOF or bleeding. Has not seen hematology in this pregnancy. Last saw someone in [place] 5 yrs ago. Has always been on iron even outside of pregnancy d/t thalassemia. She is on iron supplement. Possibly going to do IV iron but wanted to talk to hematology first, but was unable to message him, but not sure he would even respond since it has been 5 yrs. We will repeat CBC & iron studies today. Importance of active FM and SOL reviewed. Has growth ultrasound tomorrow.

## 2023-02-23 ENCOUNTER — TELEPHONE (OUTPATIENT)
Dept: OBGYN CLINIC | Facility: CLINIC | Age: 28
End: 2023-02-23

## 2023-02-23 DIAGNOSIS — Z34.83 PRENATAL CARE, SUBSEQUENT PREGNANCY IN THIRD TRIMESTER: ICD-10-CM

## 2023-02-23 DIAGNOSIS — Z86.2 H/O MICROCYTIC HYPOCHROMIC ANEMIA: Primary | ICD-10-CM

## 2023-02-23 NOTE — TELEPHONE ENCOUNTER
----- Message from Martha Mcallister CNM sent at 2/22/2023  1:46 PM CST -----  Anemia and iron deficiency is not improved with oral iron. I recommend she proceed with IV iron.  Thanks MJ

## 2023-02-23 NOTE — TELEPHONE ENCOUNTER
Notified pt of results & instructions per MJ. Bonofer order placed. Phone # to schedule given to pt.  Pt verbalized an understanding & agrees w/ pan

## 2023-03-13 ENCOUNTER — ROUTINE PRENATAL (OUTPATIENT)
Dept: OBGYN CLINIC | Facility: CLINIC | Age: 28
End: 2023-03-13

## 2023-03-13 VITALS
HEART RATE: 89 BPM | BODY MASS INDEX: 32 KG/M2 | SYSTOLIC BLOOD PRESSURE: 113 MMHG | DIASTOLIC BLOOD PRESSURE: 76 MMHG | WEIGHT: 192 LBS

## 2023-03-13 DIAGNOSIS — Z34.83 PRENATAL CARE, SUBSEQUENT PREGNANCY IN THIRD TRIMESTER: Primary | ICD-10-CM

## 2023-03-13 PROCEDURE — 3078F DIAST BP <80 MM HG: CPT | Performed by: ADVANCED PRACTICE MIDWIFE

## 2023-03-13 PROCEDURE — 3074F SYST BP LT 130 MM HG: CPT | Performed by: ADVANCED PRACTICE MIDWIFE

## 2023-03-13 NOTE — PROGRESS NOTES
Feeling well. Endorses regular fetal movement. Denies contractions, LOF, vaginal bleeding. Reviewed warning signs and when to call.  FEDERICO 1 wks

## 2023-03-16 ENCOUNTER — OFFICE VISIT (OUTPATIENT)
Dept: HEMATOLOGY/ONCOLOGY | Facility: HOSPITAL | Age: 28
End: 2023-03-16
Attending: ADVANCED PRACTICE MIDWIFE
Payer: COMMERCIAL

## 2023-03-16 VITALS
DIASTOLIC BLOOD PRESSURE: 66 MMHG | RESPIRATION RATE: 16 BRPM | HEART RATE: 86 BPM | SYSTOLIC BLOOD PRESSURE: 110 MMHG | OXYGEN SATURATION: 100 % | TEMPERATURE: 98 F

## 2023-03-16 DIAGNOSIS — O99.019 ANEMIA OF PREGNANCY: Primary | ICD-10-CM

## 2023-03-16 PROCEDURE — 96374 THER/PROPH/DIAG INJ IV PUSH: CPT

## 2023-03-16 NOTE — PROGRESS NOTES
Pt to infusion for Venofer 200mg 1 of 5. Arrives ambulating independently. Procedure for iron infusion explained including length of infusion, potential for reaction, and need for 30 min observation. Pt stated understanding. PIV established to [facility] - present blood return noted. Venofer given slow IVP through free-flowing saline. Pt appeared to tolerate well. 30 min obs completed. PIV removed, site covered with gauze and coban. Discharged to home ambulating independently with future appointments scheduled.

## 2023-03-22 ENCOUNTER — OFFICE VISIT (OUTPATIENT)
Dept: HEMATOLOGY/ONCOLOGY | Facility: HOSPITAL | Age: 28
End: 2023-03-22
Attending: ADVANCED PRACTICE MIDWIFE
Payer: COMMERCIAL

## 2023-03-22 VITALS
RESPIRATION RATE: 20 BRPM | SYSTOLIC BLOOD PRESSURE: 114 MMHG | HEART RATE: 60 BPM | DIASTOLIC BLOOD PRESSURE: 69 MMHG | TEMPERATURE: 99 F | OXYGEN SATURATION: 99 %

## 2023-03-22 DIAGNOSIS — O99.019 ANEMIA OF PREGNANCY: Primary | ICD-10-CM

## 2023-03-22 PROCEDURE — 96374 THER/PROPH/DIAG INJ IV PUSH: CPT

## 2023-03-22 NOTE — PROGRESS NOTES
Pt to infusion for Venofer 200mg 2 of 5. Arrives ambulating independently. Offers no complaints today        PIV established to [facility] - present blood return noted. Venofer given slow IVP     Pt appeared to tolerate well. 30 min obs completed. PIV removed, site covered with gauze and coban. Discharged to home ambulating independently with future appointments scheduled.

## 2023-03-24 ENCOUNTER — OFFICE VISIT (OUTPATIENT)
Dept: HEMATOLOGY/ONCOLOGY | Facility: HOSPITAL | Age: 28
End: 2023-03-24
Attending: ADVANCED PRACTICE MIDWIFE
Payer: COMMERCIAL

## 2023-03-24 VITALS
OXYGEN SATURATION: 95 % | TEMPERATURE: 98 F | RESPIRATION RATE: 16 BRPM | HEART RATE: 88 BPM | SYSTOLIC BLOOD PRESSURE: 114 MMHG | DIASTOLIC BLOOD PRESSURE: 74 MMHG

## 2023-03-24 DIAGNOSIS — O99.019 ANEMIA OF PREGNANCY: Primary | ICD-10-CM

## 2023-03-24 PROCEDURE — 96374 THER/PROPH/DIAG INJ IV PUSH: CPT

## 2023-03-24 NOTE — PROGRESS NOTES
Pt to infusion for Venofer 200mg 1 of 5. Arrives ambulating independently. PIV established to [facility] - present blood return noted. Venofer given slow IVP through free-flowing saline. Pt appeared to tolerate well. 30 min obs completed. PIV removed, site covered with gauze and coban. Discharged to home ambulating independently with future appointments scheduled.

## 2023-03-27 ENCOUNTER — OFFICE VISIT (OUTPATIENT)
Dept: HEMATOLOGY/ONCOLOGY | Facility: HOSPITAL | Age: 28
End: 2023-03-27
Attending: ADVANCED PRACTICE MIDWIFE
Payer: COMMERCIAL

## 2023-03-27 VITALS
TEMPERATURE: 98 F | DIASTOLIC BLOOD PRESSURE: 63 MMHG | OXYGEN SATURATION: 98 % | HEART RATE: 83 BPM | SYSTOLIC BLOOD PRESSURE: 116 MMHG | RESPIRATION RATE: 16 BRPM

## 2023-03-27 DIAGNOSIS — O99.019 ANEMIA OF PREGNANCY: Primary | ICD-10-CM

## 2023-03-27 PROCEDURE — 96374 THER/PROPH/DIAG INJ IV PUSH: CPT

## 2023-03-29 ENCOUNTER — APPOINTMENT (OUTPATIENT)
Dept: OBGYN CLINIC | Facility: HOSPITAL | Age: 28
End: 2023-03-29
Payer: COMMERCIAL

## 2023-03-29 ENCOUNTER — ROUTINE PRENATAL (OUTPATIENT)
Dept: OBGYN CLINIC | Facility: CLINIC | Age: 28
End: 2023-03-29

## 2023-03-29 ENCOUNTER — HOSPITAL ENCOUNTER (OUTPATIENT)
Facility: HOSPITAL | Age: 28
Discharge: HOME | End: 2023-03-29
Attending: ADVANCED PRACTICE MIDWIFE | Admitting: OBSTETRICS & GYNECOLOGY
Payer: COMMERCIAL

## 2023-03-29 VITALS
DIASTOLIC BLOOD PRESSURE: 77 MMHG | SYSTOLIC BLOOD PRESSURE: 112 MMHG | BODY MASS INDEX: 32 KG/M2 | HEART RATE: 87 BPM | WEIGHT: 191 LBS

## 2023-03-29 VITALS
HEART RATE: 91 BPM | DIASTOLIC BLOOD PRESSURE: 76 MMHG | RESPIRATION RATE: 16 BRPM | TEMPERATURE: 98 F | SYSTOLIC BLOOD PRESSURE: 116 MMHG

## 2023-03-29 DIAGNOSIS — Z34.83 PRENATAL CARE, SUBSEQUENT PREGNANCY IN THIRD TRIMESTER: Primary | ICD-10-CM

## 2023-03-29 PROBLEM — R87.612 PAPANICOLAOU SMEAR OF CERVIX WITH LOW GRADE SQUAMOUS INTRAEPITHELIAL LESION (LGSIL): Status: RESOLVED | Noted: 2017-08-07 | Resolved: 2023-03-29

## 2023-03-29 PROCEDURE — 59025 FETAL NON-STRESS TEST: CPT | Performed by: ADVANCED PRACTICE MIDWIFE

## 2023-03-29 PROCEDURE — 3074F SYST BP LT 130 MM HG: CPT | Performed by: ADVANCED PRACTICE MIDWIFE

## 2023-03-29 PROCEDURE — 59025 FETAL NON-STRESS TEST: CPT

## 2023-03-29 PROCEDURE — 3078F DIAST BP <80 MM HG: CPT | Performed by: ADVANCED PRACTICE MIDWIFE

## 2023-03-29 NOTE — PROGRESS NOTES
Active fetus Increasing BH contractions. Denies any leaking of fluid or bleeding. Reviewed S&S labor  Warning signs reviewed  All questions answered.   GBS completed

## 2023-03-31 LAB — GROUP B STREP BY PCR FOR PCR OVT: NEGATIVE

## 2023-04-01 ENCOUNTER — NST DOCUMENTATION (OUTPATIENT)
Dept: OBGYN CLINIC | Facility: CLINIC | Age: 28
End: 2023-04-01

## 2023-04-01 DIAGNOSIS — O99.210 OBESITY IN PREGNANCY: ICD-10-CM

## 2023-04-02 NOTE — NST
Nonstress Test   Patient: Cathy Grimm    Gestation: 37w6d    Diagnosis from order:  Obesity in pregnancy       NST:         NST DOCUMENTATION 3/29/2023   Variability 6-25 BPM   Accelerations Yes   Decelerations None   Baseline 140   Uterine Irritability No   Contractions Irregular   Acoustic Stimulator No   Nonstress Test Interpretation Reactive   Nonstress Test Second Interpretation Reactive   FHR Category Category I   Comments , nst for obesity, nst reactive, cindy hackett notified, pt discharged home, weekly nsts   NST Completed by acrly guerrero rn   Disposition to office for appointment    Testing Plan Weekly NST   Provider Notified cindy hackett         I agree with the above evaluation. NST completed.   Jocelyn Beltran CNM  2023  9:34 PM

## 2023-04-04 ENCOUNTER — ROUTINE PRENATAL (OUTPATIENT)
Dept: OBGYN CLINIC | Facility: CLINIC | Age: 28
End: 2023-04-04

## 2023-04-04 VITALS
SYSTOLIC BLOOD PRESSURE: 118 MMHG | WEIGHT: 192 LBS | DIASTOLIC BLOOD PRESSURE: 60 MMHG | BODY MASS INDEX: 32 KG/M2 | HEART RATE: 76 BPM

## 2023-04-04 DIAGNOSIS — Z34.83 PRENATAL CARE, SUBSEQUENT PREGNANCY IN THIRD TRIMESTER: Primary | ICD-10-CM

## 2023-04-04 PROCEDURE — 3074F SYST BP LT 130 MM HG: CPT | Performed by: ADVANCED PRACTICE MIDWIFE

## 2023-04-04 PROCEDURE — 3078F DIAST BP <80 MM HG: CPT | Performed by: ADVANCED PRACTICE MIDWIFE

## 2023-04-04 RX ORDER — BREAST PUMP
EACH MISCELLANEOUS
Qty: 1 EACH | Refills: 0 | Status: SHIPPED | OUTPATIENT
Start: 2023-04-04

## 2023-04-04 NOTE — PROGRESS NOTES
Sadie Ramirez is a 32year old , at 36w4d, here for her return OB visit. Currently, she is feeling well. Denies contractions, bleeding and leakage of fluid. Endorses active fetus. She is having her last iron transfusion tomorrow. She desires SVE today. Vital signs and weight reviewed  See flowsheets     Today's Assessment/Plan:   Birth plan reviewed:    Support:  Mirlande Roman  Pain management: Unmedicated, previous births were unmedicated  Vitamin K: Yes  Erythromycin ointment: Yes  Placenta: Discard  Circumcision: n/a  Peds: Dr. Davis Monroe with [facility]  Feeding method: breast  Housing/car seat: has  Contraception: none    Next visit: Follow up OB: 1 week    Reviewed:   3rd trimester precautions and expectations  Labor precautions  Kick counts  Danger signs  Prenatal visit schedule      Pt verbalized understanding. All questions answered.  No barriers to learning identified

## 2023-04-05 ENCOUNTER — OFFICE VISIT (OUTPATIENT)
Dept: HEMATOLOGY/ONCOLOGY | Facility: HOSPITAL | Age: 28
End: 2023-04-05
Attending: ADVANCED PRACTICE MIDWIFE
Payer: COMMERCIAL

## 2023-04-05 VITALS
OXYGEN SATURATION: 96 % | RESPIRATION RATE: 18 BRPM | DIASTOLIC BLOOD PRESSURE: 69 MMHG | HEART RATE: 80 BPM | TEMPERATURE: 99 F | SYSTOLIC BLOOD PRESSURE: 115 MMHG

## 2023-04-05 DIAGNOSIS — O99.013 ANEMIA DURING PREGNANCY IN THIRD TRIMESTER: Primary | ICD-10-CM

## 2023-04-05 PROCEDURE — 96374 THER/PROPH/DIAG INJ IV PUSH: CPT

## 2023-04-05 NOTE — PROGRESS NOTES
Patient arrives ambulatory to infusion center for Venofer 200mg 5 of 5. Denies new concerns or complaints today. Venofer 200mg given IVP over 2 mins per order, patient appeared to tolerate well. 30 mins observation period completed. No s/s of adverse reaction noted, VS WNL. PIV removed, gauze and coban applied to site. Patient discharged stable to home ambulating independently.

## 2023-04-11 ENCOUNTER — ROUTINE PRENATAL (OUTPATIENT)
Dept: OBGYN CLINIC | Facility: CLINIC | Age: 28
End: 2023-04-11

## 2023-04-11 VITALS
BODY MASS INDEX: 32 KG/M2 | SYSTOLIC BLOOD PRESSURE: 118 MMHG | WEIGHT: 194 LBS | DIASTOLIC BLOOD PRESSURE: 76 MMHG | HEART RATE: 78 BPM

## 2023-04-11 DIAGNOSIS — Z34.83 ENCOUNTER FOR SUPERVISION OF OTHER NORMAL PREGNANCY IN THIRD TRIMESTER: Primary | ICD-10-CM

## 2023-04-11 PROCEDURE — 3078F DIAST BP <80 MM HG: CPT | Performed by: ADVANCED PRACTICE MIDWIFE

## 2023-04-11 PROCEDURE — 3074F SYST BP LT 130 MM HG: CPT | Performed by: ADVANCED PRACTICE MIDWIFE

## 2023-04-11 NOTE — PROGRESS NOTES
Baby active. No SOL. Importance of active FM, SOL and warning signs reviewed. Cervix 1-2 cm. Sweep done. Call with SOL or warning signs. Was having headache today, did not sleep well last night. No vision changes. Normal b/p. Recommend tylenol with can of coke. Go to bed early today.

## 2023-04-14 ENCOUNTER — TELEPHONE (OUTPATIENT)
Dept: OBGYN CLINIC | Facility: CLINIC | Age: 28
End: 2023-04-14

## 2023-04-15 ENCOUNTER — HOSPITAL ENCOUNTER (INPATIENT)
Facility: HOSPITAL | Age: 28
LOS: 1 days | Discharge: HOME | End: 2023-04-16
Attending: ADVANCED PRACTICE MIDWIFE | Admitting: OBSTETRICS & GYNECOLOGY
Payer: COMMERCIAL

## 2023-04-15 ENCOUNTER — TELEPHONE (OUTPATIENT)
Dept: OBGYN CLINIC | Facility: CLINIC | Age: 28
End: 2023-04-15

## 2023-04-15 PROBLEM — O42.90 AMNIOTIC FLUID LEAKING: Status: ACTIVE | Noted: 2023-04-15

## 2023-04-15 PROBLEM — Z3A.39 39 WEEKS GESTATION OF PREGNANCY: Status: RESOLVED | Noted: 2020-05-20 | Resolved: 2023-04-15

## 2023-04-15 PROBLEM — Z3A.39 39 WEEKS GESTATION OF PREGNANCY (HCC): Status: RESOLVED | Noted: 2020-05-20 | Resolved: 2023-04-15

## 2023-04-15 PROBLEM — O42.90 AMNIOTIC FLUID LEAKING (HCC): Status: RESOLVED | Noted: 2023-04-15 | Resolved: 2023-04-15

## 2023-04-15 PROBLEM — Z3A.39 39 WEEKS GESTATION OF PREGNANCY (HCC): Status: ACTIVE | Noted: 2020-05-20

## 2023-04-15 PROBLEM — Z3A.39 39 WEEKS GESTATION OF PREGNANCY: Status: ACTIVE | Noted: 2020-05-20

## 2023-04-15 PROBLEM — O42.90 AMNIOTIC FLUID LEAKING (HCC): Status: ACTIVE | Noted: 2023-04-15

## 2023-04-15 PROBLEM — O42.90 AMNIOTIC FLUID LEAKING: Status: RESOLVED | Noted: 2023-04-15 | Resolved: 2023-04-15

## 2023-04-15 LAB
ANTIBODY SCREEN: NEGATIVE
BASOPHILS # BLD AUTO: 0.02 X10(3) UL (ref 0–0.2)
BASOPHILS NFR BLD AUTO: 0.3 %
DEPRECATED RDW RBC AUTO: 46.5 FL (ref 35.1–46.3)
EOSINOPHIL # BLD AUTO: 0.03 X10(3) UL (ref 0–0.7)
EOSINOPHIL NFR BLD AUTO: 0.4 %
ERYTHROCYTE [DISTWIDTH] IN BLOOD BY AUTOMATED COUNT: 20.7 % (ref 11–15)
HCT VFR BLD AUTO: 33.1 %
HGB BLD-MCNC: 10.7 G/DL
IMM GRANULOCYTES # BLD AUTO: 0.06 X10(3) UL (ref 0–1)
IMM GRANULOCYTES NFR BLD: 0.8 %
LYMPHOCYTES # BLD AUTO: 0.86 X10(3) UL (ref 1–4)
LYMPHOCYTES NFR BLD AUTO: 11.5 %
MCH RBC QN AUTO: 21.9 PG (ref 26–34)
MCHC RBC AUTO-ENTMCNC: 32.3 G/DL (ref 31–37)
MCV RBC AUTO: 67.8 FL
MONOCYTES # BLD AUTO: 0.45 X10(3) UL (ref 0.1–1)
MONOCYTES NFR BLD AUTO: 6 %
NEUTROPHILS # BLD AUTO: 6.03 X10 (3) UL (ref 1.5–7.7)
NEUTROPHILS # BLD AUTO: 6.03 X10(3) UL (ref 1.5–7.7)
NEUTROPHILS NFR BLD AUTO: 81 %
PLATELET # BLD AUTO: 236 10(3)UL (ref 150–450)
RBC # BLD AUTO: 4.88 X10(6)UL
RH BLOOD TYPE: POSITIVE
WBC # BLD AUTO: 7.5 X10(3) UL (ref 4–11)

## 2023-04-15 PROCEDURE — 59400 OBSTETRICAL CARE: CPT | Performed by: ADVANCED PRACTICE MIDWIFE

## 2023-04-15 RX ORDER — ONDANSETRON 2 MG/ML
4 INJECTION INTRAMUSCULAR; INTRAVENOUS EVERY 6 HOURS PRN
Status: DISCONTINUED | OUTPATIENT
Start: 2023-04-15 | End: 2023-04-16

## 2023-04-15 RX ORDER — ACETAMINOPHEN 500 MG
500 TABLET ORAL EVERY 6 HOURS PRN
Status: DISCONTINUED | OUTPATIENT
Start: 2023-04-15 | End: 2023-04-15

## 2023-04-15 RX ORDER — IBUPROFEN 600 MG/1
600 TABLET ORAL ONCE AS NEEDED
Status: DISCONTINUED | OUTPATIENT
Start: 2023-04-15 | End: 2023-04-15 | Stop reason: HOSPADM

## 2023-04-15 RX ORDER — ACETAMINOPHEN 500 MG
1000 TABLET ORAL EVERY 6 HOURS PRN
Status: DISCONTINUED | OUTPATIENT
Start: 2023-04-15 | End: 2023-04-16

## 2023-04-15 RX ORDER — ACETAMINOPHEN 500 MG
500 TABLET ORAL EVERY 6 HOURS PRN
Status: DISCONTINUED | OUTPATIENT
Start: 2023-04-15 | End: 2023-04-16

## 2023-04-15 RX ORDER — IBUPROFEN 600 MG/1
600 TABLET ORAL EVERY 6 HOURS
Status: DISCONTINUED | OUTPATIENT
Start: 2023-04-15 | End: 2023-04-16

## 2023-04-15 RX ORDER — SIMETHICONE 80 MG
80 TABLET,CHEWABLE ORAL 3 TIMES DAILY PRN
Status: DISCONTINUED | OUTPATIENT
Start: 2023-04-15 | End: 2023-04-16

## 2023-04-15 RX ORDER — ONDANSETRON 2 MG/ML
4 INJECTION INTRAMUSCULAR; INTRAVENOUS EVERY 6 HOURS PRN
Status: DISCONTINUED | OUTPATIENT
Start: 2023-04-15 | End: 2023-04-15

## 2023-04-15 RX ORDER — DOCUSATE SODIUM 100 MG/1
100 CAPSULE, LIQUID FILLED ORAL
Status: DISCONTINUED | OUTPATIENT
Start: 2023-04-15 | End: 2023-04-16

## 2023-04-15 RX ORDER — DEXTROSE, SODIUM CHLORIDE, SODIUM LACTATE, POTASSIUM CHLORIDE, AND CALCIUM CHLORIDE 5; .6; .31; .03; .02 G/100ML; G/100ML; G/100ML; G/100ML; G/100ML
INJECTION, SOLUTION INTRAVENOUS CONTINUOUS
Status: DISCONTINUED | OUTPATIENT
Start: 2023-04-15 | End: 2023-04-15 | Stop reason: HOSPADM

## 2023-04-15 RX ORDER — DIAPER,BRIEF,INFANT-TODD,DISP
1 EACH MISCELLANEOUS EVERY 6 HOURS PRN
Status: DISCONTINUED | OUTPATIENT
Start: 2023-04-15 | End: 2023-04-16

## 2023-04-15 RX ORDER — BISACODYL 10 MG
10 SUPPOSITORY, RECTAL RECTAL ONCE AS NEEDED
Status: DISCONTINUED | OUTPATIENT
Start: 2023-04-15 | End: 2023-04-16

## 2023-04-15 RX ORDER — AMMONIA INHALANTS 0.04 G/.3ML
0.3 INHALANT RESPIRATORY (INHALATION) AS NEEDED
Status: DISCONTINUED | OUTPATIENT
Start: 2023-04-15 | End: 2023-04-16

## 2023-04-15 RX ORDER — LIDOCAINE HYDROCHLORIDE 10 MG/ML
30 INJECTION, SOLUTION EPIDURAL; INFILTRATION; INTRACAUDAL; PERINEURAL ONCE
Status: DISCONTINUED | OUTPATIENT
Start: 2023-04-15 | End: 2023-04-15 | Stop reason: HOSPADM

## 2023-04-15 RX ORDER — SODIUM CHLORIDE, SODIUM LACTATE, POTASSIUM CHLORIDE, CALCIUM CHLORIDE 600; 310; 30; 20 MG/100ML; MG/100ML; MG/100ML; MG/100ML
INJECTION, SOLUTION INTRAVENOUS AS NEEDED
Status: DISCONTINUED | OUTPATIENT
Start: 2023-04-15 | End: 2023-04-15 | Stop reason: HOSPADM

## 2023-04-15 RX ORDER — TRISODIUM CITRATE DIHYDRATE AND CITRIC ACID MONOHYDRATE 500; 334 MG/5ML; MG/5ML
30 SOLUTION ORAL AS NEEDED
Status: DISCONTINUED | OUTPATIENT
Start: 2023-04-15 | End: 2023-04-15 | Stop reason: HOSPADM

## 2023-04-15 RX ORDER — TERBUTALINE SULFATE 1 MG/ML
0.25 INJECTION, SOLUTION SUBCUTANEOUS AS NEEDED
Status: DISCONTINUED | OUTPATIENT
Start: 2023-04-15 | End: 2023-04-15 | Stop reason: HOSPADM

## 2023-04-15 RX ORDER — ACETAMINOPHEN 500 MG
1000 TABLET ORAL EVERY 6 HOURS PRN
Status: DISCONTINUED | OUTPATIENT
Start: 2023-04-15 | End: 2023-04-15

## 2023-04-15 NOTE — PROGRESS NOTES
Received patient into room 354 via WC . Bedside shift report received from [employer]. Pt transferred to bed. Bed in lowest and locked position. Side rails up x2. VSS. IV site unremarkable. Baby present in open crib. ID bands matched with L&D RN. Patient and family oriented to unit, room and call light within reach. Safety measures in place, POC followed. Will continue to monitor per protocol. Advised to call for assistance to bathroom.

## 2023-04-15 NOTE — PROGRESS NOTES
Patient up to bathroom with assist x 2. Unable to void at this time. Bladder scan done. See pt chart. Patient transferred to mother/baby room 354 per wheelchair in stable condition with baby and personal belongings. Accompanied by significant other and staff. Report given to mother/baby RN.

## 2023-04-15 NOTE — L&D DELIVERY NOTE
Mayelin Love, Girl [[id number]]    Labor Events     labor?: No   steroids?: None  Antibiotics received during labor?: No  Rupture date/time: 4/15/2023 0600     Rupture type: SROM  Fluid color: Clear, Meconium  Augmentation: None  Intrapartum & labor complications: Meconium     Labor Event Times    Labor onset date/time: 4/15/2023 0630  Dilation complete date/time: 4/15/2023 1207  Start pushing date/time: 4/15/2023 1208      Presentation    Presentation: Vertex  Position: Occiput Anterior     Operative Delivery    Operative Vaginal Delivery: No           Shoulder Dystocia    Shoulder Dystocia: No     Anesthesia    Method: None           Delivery    Head delivery date/time: 4/15/2023 12:11:08   Delivery date/time:  4/15/23 12:11:19   Delivery type: Normal spontaneous vaginal delivery    Details:        Delivery location: delivery room   Delivery Room Temperature: 73      Delivery Providers    Delivering Clinician: Liza Peña CNM   Delivery personnel:  Provider Role   Luis Oliva, RN Baby Nurse   Shaniqua Parker, RN Delivery Nurse   Halima Minium Surgical Tech         Cord    Vessels: 3 Vessels  Complications: Body cord, Nuchal  # of loops: 1  Timed cord clamping: Yes  Time in sec: 180  Cord blood disposition: to lab  Gases sent?: No     Resuscitation    Method: None     [place] Measurements    Weight: 3750 g 8 lb 4.3 oz Length: 49 cm   Head circum. : 35 cm          Placenta    Date/time: 4/15/2023 1218  Removal: Spontaneous  Appearance: Intact  Disposition: Pathology     Apgars    Living status: Living   Apgar Scoring Key:    0 1 2    Skin color Blue or pale Acrocyanotic Completely pink    Heart rate Absent <100 bpm >100 bpm    Reflex irritability No response Grimace Cry or active withdrawal    Muscle tone Limp Some flexion Active motion    Respiratory effort Absent Weak cry; hypoventilation Good, crying            1 Minute:  5 Minute:  10 Minute:  15 Minute:  20 Minute:    Skin color: 0  1       Heart rate: 2  2       Reflex irritablity: 2  2       Muscle tone: 2  2       Respiratory effort: 2  2       Total: 8  9           Apgars assigned by: CHINO GRIMES   [place]ler disposition: with mother         Skin to Skin    Skin to skin initiated date/time: 4/15/2023 1211  Skin to skin with: Mother     Vaginal Count    Initial count RN: Chase Lóen RN  Initial count Tech: González Peña CNM   Sponges   Sharps    Initial counts 10   0    Final counts 10   0    Final count RN: Chase León RN  Final count MD: González Peña CNM     Delivery (Maternal)    Episiotomy: None  Perineal lacerations: None    Vaginal laceration?: No    Cervical laceration?: No    Clitoral laceration?: No              Tisha progressed to complete dilatation and 0 station prior to pushing successfully to viable baby girl. See Delivery Summary above for time, APGARs, and weight. Fetal head presented in the OA position. Sweep for nuchal cord was performed and loose nuchal cord x1 identified. Pt progressed rapidly before nuchal could be reduced so delivered through. Anterior shoulder delivered spontaneously without traction. Baby vigorous at birth. To maternal abdomen for dry and stimulation then cord cut after pulsing ceased. Prophylactic IV pitocin initiated in 3rd stage. Spontaneous placenta by Brown Dylon mechanism with trailing membranes, complete with 3 vessel cord. Hemostasis achieved by fundal massage and prophylactic IV Pitocin. Vagina and perineum inspected and intact. Mother and infant appeared in stable condition when midwife left the delivery room. Sharps and 4x4 counts were correct. Breastfeeding initiated.     Quantitative Blood Loss (mL)  175ml

## 2023-04-15 NOTE — PROGRESS NOTES
Pt is a 32year old female admitted to [facility]2/[facility]-A. Patient presents with:  R/o Rom  R/o Labor: Leaking since 0600, contractions since 630      Pt is  39w6d intra-uterine pregnancy. History obtained, consents signed. Oriented to room, staff, and plan of care.

## 2023-04-15 NOTE — TELEPHONE ENCOUNTER
Terrance Burkitt paged to report that she has been having contractions every 5 minutes for about an hour and she thinks her water bag broke. Pt advised to come to hospital for evaluation. She voiced understanding and agreed.

## 2023-04-16 VITALS
HEART RATE: 88 BPM | RESPIRATION RATE: 17 BRPM | SYSTOLIC BLOOD PRESSURE: 108 MMHG | TEMPERATURE: 99 F | DIASTOLIC BLOOD PRESSURE: 73 MMHG

## 2023-04-16 LAB
BASOPHILS # BLD AUTO: 0.03 X10(3) UL (ref 0–0.2)
BASOPHILS NFR BLD AUTO: 0.3 %
DEPRECATED RDW RBC AUTO: 46.6 FL (ref 35.1–46.3)
EOSINOPHIL # BLD AUTO: 0.07 X10(3) UL (ref 0–0.7)
EOSINOPHIL NFR BLD AUTO: 0.7 %
ERYTHROCYTE [DISTWIDTH] IN BLOOD BY AUTOMATED COUNT: 20.2 % (ref 11–15)
HCT VFR BLD AUTO: 30 %
HGB BLD-MCNC: 9.4 G/DL
IMM GRANULOCYTES # BLD AUTO: 0.07 X10(3) UL (ref 0–1)
IMM GRANULOCYTES NFR BLD: 0.7 %
LYMPHOCYTES # BLD AUTO: 1.43 X10(3) UL (ref 1–4)
LYMPHOCYTES NFR BLD AUTO: 15 %
MCH RBC QN AUTO: 21.5 PG (ref 26–34)
MCHC RBC AUTO-ENTMCNC: 31.3 G/DL (ref 31–37)
MCV RBC AUTO: 68.6 FL
MONOCYTES # BLD AUTO: 0.89 X10(3) UL (ref 0.1–1)
MONOCYTES NFR BLD AUTO: 9.3 %
NEUTROPHILS # BLD AUTO: 7.06 X10 (3) UL (ref 1.5–7.7)
NEUTROPHILS # BLD AUTO: 7.06 X10(3) UL (ref 1.5–7.7)
NEUTROPHILS NFR BLD AUTO: 74 %
PLATELET # BLD AUTO: 213 10(3)UL (ref 150–450)
RBC # BLD AUTO: 4.37 X10(6)UL
WBC # BLD AUTO: 9.6 X10(3) UL (ref 4–11)

## 2023-04-16 NOTE — PROGRESS NOTES
Verbal and written discharge instructions given to patient. RN educates patient on discharge medications, breast care, postpartum depression, pre-eclampsia s/s, and when to call the provider. RN instructs patient to call and schedule a follow up appointment with CNM for 2 weeks and for 6 weeks from today. Patient confirms understanding. Mothers bands have been confirmed to match infants. Patient confirms understanding with no additional questions or concerns at this time. Mother will call RN when she is ready to discharge. Pt family and call light are within reach.

## 2023-04-16 NOTE — PLAN OF CARE
Problem: Patient Centered Care  Goal: Patient preferences are identified and integrated in the patient's plan of care  Description: Interventions:  - What would you like us to know as we care for you?   - Provide timely, complete, and accurate information to patient/family  - Incorporate patient and family knowledge, values, beliefs, and cultural backgrounds into the planning and delivery of care  - Encourage patient/family to participate in care and decision-making at the level they choose  - Honor patient and family perspectives and choices  4/15/2023 2306 by Jasper Foster RN  Outcome: Progressing  4/15/2023 2306 by Jasper Foster RN  Outcome: Progressing     Problem: Patient/Family Goals  Goal: Patient/Family Long Term Goal  Description: Patient's Long Term Goal:     Interventions:  -   - See additional Care Plan goals for specific interventions  4/15/2023 2306 by Jasper Foster RN  Outcome: Progressing  4/15/2023 2306 by Jasper Foster RN  Outcome: Progressing  Goal: Patient/Family Short Term Goal  Description: Patient's Short Term Goal:     Interventions:   -   - See additional Care Plan goals for specific interventions  4/15/2023 2306 by Jasper Foster RN  Outcome: Progressing  4/15/2023 2306 by Jasper Foster RN  Outcome: Progressing     Problem: POSTPARTUM  Goal: Long Term Goal:Experiences normal postpartum course  Description: INTERVENTIONS:  - Assess and monitor vital signs and lab values. - Assess fundus and lochia. - Provide ice/sitz baths for perineum discomfort. - Monitor healing of incision/episiotomy/laceration, and assess for signs and symptoms of infection and hematoma. - Assess bladder function and monitor for bladder distention.  - Provide/instruct/assist with pericare as needed. - Provide VTE prophylaxis as needed. - Monitor bowel function.  - Encourage ambulation and provide assistance as needed.   - Assess and monitor emotional status and provide social service/psych resources as needed. - Utilize standard precautions and use personal protective equipment as indicated. Ensure aseptic care of all intravenous lines and invasive tubes/drains.  - Obtain immunization and exposure to communicable diseases history. Outcome: Progressing  Goal: Optimize infant feeding at the breast  Description: INTERVENTIONS:  - Initiate breast feeding within first hour after birth. - Monitor effectiveness of current breast feeding efforts. - Assess support systems available to mother/family.  - Identify cultural beliefs/practices regarding lactation, letdown techniques, maternal food preferences. - Assess mother's knowledge and previous experience with breast feeding.  - Provide information as needed about early infant feeding cues (e.g., rooting, lip smacking, sucking fingers/hand) versus late cue of crying.  - Discuss/demonstrate breast feeding aids (e.g., infant sling, nursing footstool/pillows, and breast pumps). - Encourage mother/other family members to express feelings/concerns, and actively listen. - Educate father/SO about benefits of breast feeding and how to manage common lactation challenges. - Recommend avoidance of specific medications or substances incompatible with breast feeding.  - Assess and monitor for signs of nipple pain/trauma. - Instruct and provide assistance with proper latch. - Review techniques for milk expression (breast pumping) and storage of breast milk. Provide pumping equipment/supplies, instructions and assistance, as needed. - Encourage rooming-in and breast feeding on demand.  - Encourage skin-to-skin contact. - Provide LC support as needed. - Assess for and manage engorgement. - Provide breast feeding education handouts and information on community breast feeding support.    Outcome: Progressing  Goal: Establishment of adequate milk supply with medication/procedure interruptions  Description: INTERVENTIONS:  - Review techniques for milk expression (breast pumping). - Provide pumping equipment/supplies, instructions, and assistance until it is safe to breastfeed infant. Outcome: Progressing  Goal: Experiences normal breast weaning course  Description: INTERVENTIONS:  - Assess for and manage engorgement. - Instruct on breast care. - Provide comfort measures. Outcome: Progressing  Goal: Appropriate maternal -  bonding  Description: INTERVENTIONS:  - Assess caregiver- interactions. - Assess caregiver's emotional status and coping mechanisms. - Encourage caregiver to participate in  daily care. - Assess support systems available to mother/family.  - Provide /case management support as needed.   Outcome: Progressing     Problem: Patient Centered Care  Goal: Patient preferences are identified and integrated in the patient's plan of care  Description: Interventions:  - What would you like us to know as we care for you?   - Provide timely, complete, and accurate information to patient/family  - Incorporate patient and family knowledge, values, beliefs, and cultural backgrounds into the planning and delivery of care  - Encourage patient/family to participate in care and decision-making at the level they choose  - Honor patient and family perspectives and choices  4/15/2023 2306 by Mily Subramanian RN  Outcome: Progressing  4/15/2023 2306 by Mily Subramanian RN  Outcome: Progressing     Problem: Patient/Family Goals  Goal: Patient/Family Long Term Goal  Description: Patient's Long Term Goal:     Interventions:  -   - See additional Care Plan goals for specific interventions  4/15/2023 2306 by Mily Subramanian RN  Outcome: Progressing  4/15/2023 2306 by Mily Subramanian RN  Outcome: Progressing  Goal: Patient/Family Short Term Goal  Description: Patient's Short Term Goal:     Interventions:   -   - See additional Care Plan goals for specific interventions  4/15/2023 2306 by Mily Subramanian RN  Outcome: Progressing  4/15/2023 2306 by Zunilda Gallardo RN  Outcome: Progressing     Problem: POSTPARTUM  Goal: Long Term Goal:Experiences normal postpartum course  Description: INTERVENTIONS:  - Assess and monitor vital signs and lab values. - Assess fundus and lochia. - Provide ice/sitz baths for perineum discomfort. - Monitor healing of incision/episiotomy/laceration, and assess for signs and symptoms of infection and hematoma. - Assess bladder function and monitor for bladder distention.  - Provide/instruct/assist with pericare as needed. - Provide VTE prophylaxis as needed. - Monitor bowel function.  - Encourage ambulation and provide assistance as needed. - Assess and monitor emotional status and provide social service/psych resources as needed. - Utilize standard precautions and use personal protective equipment as indicated. Ensure aseptic care of all intravenous lines and invasive tubes/drains.  - Obtain immunization and exposure to communicable diseases history. Outcome: Progressing  Goal: Optimize infant feeding at the breast  Description: INTERVENTIONS:  - Initiate breast feeding within first hour after birth. - Monitor effectiveness of current breast feeding efforts. - Assess support systems available to mother/family.  - Identify cultural beliefs/practices regarding lactation, letdown techniques, maternal food preferences. - Assess mother's knowledge and previous experience with breast feeding.  - Provide information as needed about early infant feeding cues (e.g., rooting, lip smacking, sucking fingers/hand) versus late cue of crying.  - Discuss/demonstrate breast feeding aids (e.g., infant sling, nursing footstool/pillows, and breast pumps). - Encourage mother/other family members to express feelings/concerns, and actively listen. - Educate father/SO about benefits of breast feeding and how to manage common lactation challenges.   - Recommend avoidance of specific medications or substances incompatible with breast feeding.  - Assess and monitor for signs of nipple pain/trauma. - Instruct and provide assistance with proper latch. - Review techniques for milk expression (breast pumping) and storage of breast milk. Provide pumping equipment/supplies, instructions and assistance, as needed. - Encourage rooming-in and breast feeding on demand.  - Encourage skin-to-skin contact. - Provide LC support as needed. - Assess for and manage engorgement. - Provide breast feeding education handouts and information on community breast feeding support. Outcome: Progressing  Goal: Establishment of adequate milk supply with medication/procedure interruptions  Description: INTERVENTIONS:  - Review techniques for milk expression (breast pumping). - Provide pumping equipment/supplies, instructions, and assistance until it is safe to breastfeed infant. Outcome: Progressing  Goal: Experiences normal breast weaning course  Description: INTERVENTIONS:  - Assess for and manage engorgement. - Instruct on breast care. - Provide comfort measures. Outcome: Progressing  Goal: Appropriate maternal -  bonding  Description: INTERVENTIONS:  - Assess caregiver- interactions. - Assess caregiver's emotional status and coping mechanisms. - Encourage caregiver to participate in  daily care. - Assess support systems available to mother/family.  - Provide /case management support as needed.   Outcome: Progressing

## 2023-04-16 NOTE — PROGRESS NOTES
Patient calls out that she is ready to discharge to home. [facility] takes patient via wheelchair, with baby in carseat, belongings, and family, in stable condition, to discharge to home to self care at this time.

## 2023-04-16 NOTE — DISCHARGE SUMMARY
[facility]    Discharge Summary    Romel Metzger Patient Status:  Inpatient    10/23/1995 MRN [id number]   Location [facility] 3SE Attending Adam Heart, [address] Day # 1       Delivering OB Clinician: Adam Heart CNM    EDC: Estimated Date of Delivery: 23    Gestational Age: 39w6d    Antepartum complications: Patient Active Problem List:     History of hypochromic microcytic anemia     Alpha thalassaemia minor     Obesity in pregnancy     Stress incontinence of urine     Subareolar mass of right breast     Anemia of pregnancy      (normal spontaneous vaginal delivery)      Date of Delivery: 4/15/2023 Time of Delivery: 12:11 PM    Delivery Type: spontaneous vaginal delivery    Baby: Liveborn female Information for the patient's : Marshall Nails, Girl [[id number]]   8 lb 4.3 oz (3.75 kg)  Apgars:  1 minute: 8  5 minutes: 910 minutes:       Intrapartum Complications: None    Admit Date: 4/15/2023    Discharge Date: 2023    Hospital Course: No complications Routine delivery and postpartum care    Discharged Condition: stable PP Day 1.  Breastfeeding    Disposition: home    Plan:     Follow-up appointment in 2 weeks with SUSANNE Spaulding [place]  2023  10:29 AM

## 2023-04-18 ENCOUNTER — PATIENT OUTREACH (OUTPATIENT)
Dept: CASE MANAGEMENT | Age: 28
End: 2023-04-18

## 2023-04-18 NOTE — PROGRESS NOTES
1st attempt OBGYN Post Partum apt request  (delivered 04/15)    Ronit Muhammad, [address]  [address] [facility] [address]  [phone]  2w virtual - Tue 05/02 @10:30am w/Luisito Barrera  6w - Wed 5/31 @3:45pm w/Marisel He  PCP - pt following up w/OBGYN (p/p)  Confirmed w/pt  Closing encounter

## 2023-05-02 ENCOUNTER — TELEMEDICINE (OUTPATIENT)
Dept: OBGYN CLINIC | Facility: CLINIC | Age: 28
End: 2023-05-02

## 2023-05-02 NOTE — PROGRESS NOTES
This visit is conducted using Telemedicine with live, interactive video and audio. Patient has been referred to the [facility] website at [email or web address] to review the yearly Consent to Treat document. Patient understands and accepts financial responsibility for any deductible, co-insurance and/or co-pays associated with this service. Duration of face-to-face time in visit was 8 minutes. Subjective: Terrance Burkitt is 2 weeks postpartum after a vaginal delivery of a baby girl. See L&D delivery summary for birth statistics. She is without concerns today. Bleeding is decreasing and not experiencing any excessive pain. Breastfeeding successfully and reports infant is experiencing adequate weight gain. She denies any signs/symptoms of postpartum depression and has adequate family support. Denies any abuse. Contraceptive plan: declines contraception due to her [employer]. Reports they do NFP only. Review of Systems   Constitutional: Negative for chills and fever. Eyes: Negative for blurred vision. Respiratory: Negative for cough, shortness of breath and wheezing. Cardiovascular: Negative for chest pain and palpitations. Gastrointestinal: Negative for diarrhea, nausea and vomiting. Genitourinary: Negative for dysuria and frequency. Neurological: Negative for dizziness and headaches. Psychiatric/Behavioral: Negative for depression and suicidal ideas. Objective: There were no vitals filed for this visit. Wt Readings from Last 6 Encounters:  04/11/23 : 194 lb (88 kg)  04/04/23 : 192 lb (87.1 kg)  03/29/23 : 191 lb (86.6 kg)  03/13/23 : 192 lb (87.1 kg)  02/22/23 : 188 lb (85.3 kg)  02/21/23 : 188 lb (85.3 kg)      Physical Exam  Constitutional:       General: She is not in acute distress. Appearance: Normal appearance. HENT:      Head: Normocephalic and atraumatic. Pulmonary:      Effort: Pulmonary effort is normal. No respiratory distress.    Neurological:      Mental Status: She is alert and oriented to person, place, and time. Psychiatric:         Mood and Affect: Mood normal.         Behavior: Behavior normal.         Thought Content: Thought content normal.         Judgment: Judgment normal.   Vitals reviewed. Assessment/Plan:   2 weeks postpartum, stable. Breastfeeding without difficulty  Contraception: NFP  Today's Plan: CBC ordered for pt to complete at her 6w visit. She will continue iron supplement until then  Return to clinic: 4 weeks for 6w PPV    Counseling included:   Signs/symptoms of postpartum depression  Postpartum danger signs  Lactation support and troubleshooting  Maternal and family adaption  Sleep/rest strategies  Sexuality  Infant safety and care  Parenting concerns  Occupational concerns  Contraception    Tisha verbalized understanding of plan of care. All questions answered. No barriers to learning identified.

## 2023-05-10 ENCOUNTER — TELEPHONE (OUTPATIENT)
Dept: OBGYN UNIT | Facility: HOSPITAL | Age: 28
End: 2023-05-10

## 2023-05-31 ENCOUNTER — POSTPARTUM (OUTPATIENT)
Dept: OBGYN CLINIC | Facility: CLINIC | Age: 28
End: 2023-05-31

## 2023-05-31 VITALS
DIASTOLIC BLOOD PRESSURE: 67 MMHG | WEIGHT: 177 LBS | BODY MASS INDEX: 29.49 KG/M2 | HEART RATE: 76 BPM | SYSTOLIC BLOOD PRESSURE: 103 MMHG | HEIGHT: 65 IN

## 2023-05-31 DIAGNOSIS — N39.3 STRESS INCONTINENCE OF URINE: ICD-10-CM

## 2023-05-31 PROCEDURE — 3074F SYST BP LT 130 MM HG: CPT | Performed by: ADVANCED PRACTICE MIDWIFE

## 2023-05-31 PROCEDURE — 3008F BODY MASS INDEX DOCD: CPT | Performed by: ADVANCED PRACTICE MIDWIFE

## 2023-05-31 PROCEDURE — 3078F DIAST BP <80 MM HG: CPT | Performed by: ADVANCED PRACTICE MIDWIFE

## 2023-05-31 NOTE — PROGRESS NOTES
Subjective: Susie Moreno is 6 weeks postpartum after a vaginal delivery of a baby girl. See L&D delivery summary for birth statistics. Bleeding is decreasing and not experiencing any excessive pain. She has some concern for urine leakage with laughing and coughing. No breast concerns or pain. Breastfeeding successfully and reports infant is experiencing adequate weight gain. She denies any signs/symptoms of postpartum depression and has adequate family support. Denies any abuse. Contraceptive plan: planning condoms then NFP    EPDS Score: 1  BELTRAN-7 Score: 1    Review of Systems   Constitutional: Negative for chills and fever. Respiratory: Negative for shortness of breath. Cardiovascular: Negative for chest pain. Gastrointestinal: Negative for abdominal pain and constipation. Genitourinary: Negative for dysuria, frequency and urgency. Neurological: Negative for dizziness. Psychiatric/Behavioral: Negative for depression and suicidal ideas. The patient is not nervous/anxious. Objective:    05/31/23  1547   BP: 103/67   Pulse: 76     Wt Readings from Last 6 Encounters:  05/31/23 : 177 lb (80.3 kg)  04/11/23 : 194 lb (88 kg)  04/04/23 : 192 lb (87.1 kg)  03/29/23 : 191 lb (86.6 kg)  03/13/23 : 192 lb (87.1 kg)  02/22/23 : 188 lb (85.3 kg)      Physical Exam  Constitutional:       Appearance: Normal appearance. Genitourinary:      No lesions in the vagina. Right Labia: No tenderness or lesions. Left Labia: No tenderness or lesions. No vaginal discharge, bleeding or granulation tissue. No vaginal prolapse present. Right Adnexa: not tender and no mass present. Left Adnexa: not tender and no mass present. No cervical motion tenderness. Uterus is not enlarged or tender. Bladder is not tender and urgency on palpation not present. Pelvic Floor: Levator muscle strength is 4/5. Rectum:      No external hemorrhoid. HENT:      Head: Normocephalic. Cardiovascular:      Rate and Rhythm: Normal rate. Pulmonary:      Effort: Pulmonary effort is normal.   Abdominal:      General: Abdomen is flat. Palpations: Abdomen is soft. Musculoskeletal:         General: Normal range of motion. Cervical back: Normal range of motion. Neurological:      General: No focal deficit present. Mental Status: She is alert and oriented to person, place, and time. Skin:     General: Skin is warm and dry. Psychiatric:         Mood and Affect: Mood normal.         Behavior: Behavior normal.         Thought Content: Thought content normal.         Judgment: Judgment normal.   Vitals and nursing note reviewed. Assessment/Plan:   6 weeks postpartum, stable. Breast feeding without difficulty  Contraception: condoms then NFP   Today's Plan:   Pap up to date   Pelvic floor PT referral sent and pt to schedule  Return to work letter sent to pt Cami     Return to clinic: before end of 2023 for annual exam     Counseling included:   Contraception including POPs, Depo, IUDs, emergency contraception: pt declines all at this time  Signs/symptoms of postpartum depression  Postpartum danger signs and when to call SUSANNE  Maternal and family adaption  Sexuality    Sheeba Lozano verbalized understanding of plan of care. All questions answered. No barriers to learning identified. Diagnoses and all orders for this visit:    6 weeks postpartum follow-up    Stress incontinence of urine  -     PELVIC FLOOR THERAPY - INTERNAL      YUMI Jennings/LEO under direct supervision of Simba Cash CNM    I personally witnessed the patient's exam and medical decision making on this date of service. I was physically present in the room for the performance of the E/M service.   I have reviewed the SUSANNE student's documentation and findings including history, Exam, and Medical Decision Making, edited the document for accuracy and verify that it represents the clinical findings and services performed.

## 2023-05-31 NOTE — PROGRESS NOTES
I personally performed the patient's exam and medical decision making on this date of service. I was physically present in the room for the performance of the E/M service. I have reviewed the CNM student's documentation and findings including history, Exam, and Medical Decision Making, edited the document for accuracy and verify that it represents the clinical findings and services performed.

## 2024-04-01 ENCOUNTER — TELEPHONE (OUTPATIENT)
Dept: FAMILY MEDICINE CLINIC | Facility: CLINIC | Age: 29
End: 2024-04-01

## 2024-04-01 ENCOUNTER — APPOINTMENT (OUTPATIENT)
Dept: GENERAL RADIOLOGY | Age: 29
End: 2024-04-01
Attending: NURSE PRACTITIONER
Payer: COMMERCIAL

## 2024-04-01 ENCOUNTER — HOSPITAL ENCOUNTER (OUTPATIENT)
Age: 29
Discharge: HOME | End: 2024-04-01
Payer: COMMERCIAL

## 2024-04-01 ENCOUNTER — TELEPHONE (OUTPATIENT)
Dept: OBGYN CLINIC | Facility: CLINIC | Age: 29
End: 2024-04-01

## 2024-04-01 VITALS
TEMPERATURE: 98 F | RESPIRATION RATE: 18 BRPM | HEART RATE: 77 BPM | SYSTOLIC BLOOD PRESSURE: 122 MMHG | OXYGEN SATURATION: 100 % | DIASTOLIC BLOOD PRESSURE: 83 MMHG

## 2024-04-01 DIAGNOSIS — S92.355A CLOSED NONDISPLACED FRACTURE OF FIFTH METATARSAL BONE OF LEFT FOOT, INITIAL ENCOUNTER: Primary | ICD-10-CM

## 2024-04-01 DIAGNOSIS — S99.922A INJURY OF LEFT FOOT, INITIAL ENCOUNTER: ICD-10-CM

## 2024-04-01 PROCEDURE — E0114 CRUTCH UNDERARM PAIR NO WOOD: HCPCS | Performed by: NURSE PRACTITIONER

## 2024-04-01 PROCEDURE — 29515 APPLICATION SHORT LEG SPLINT: CPT | Performed by: NURSE PRACTITIONER

## 2024-04-01 PROCEDURE — 99203 OFFICE O/P NEW LOW 30 MIN: CPT | Performed by: NURSE PRACTITIONER

## 2024-04-01 PROCEDURE — 73630 X-RAY EXAM OF FOOT: CPT | Performed by: NURSE PRACTITIONER

## 2024-04-01 NOTE — TELEPHONE ENCOUNTER
Patient is requesting referral.     Name of specialist and specialty department : orthopedic doctor   Reason for visit with the specialist: broken left foot; IC in [place] referred her to orthopedic doc; foot hurts/swollen  Address of the specialist office:[address] [address] recommends  Appointment date:    none      CSS informed patient the turnaround time for referral is 5-7 business days.  Patient was informed to check their [employer] account for referral status.

## 2024-04-01 NOTE — ED PROVIDER NOTES
Patient Seen in: [facility]      History     Chief Complaint   Patient presents with    Leg or Foot Injury     Entered by patient     Stated Complaint: Leg or Foot Injury    Subjective:   HPI  Patient is a 28-year-old female who presents to the immediate care center with concern for pain and swelling to the left foot.  Approximately 1 week ago she was standing on an elevated surface, turn to move herself from that surface, slipped, fell, twisting the foot.  Has had pain this been isolated to the foot since this time.  She has been walking without difficulty.  She denies motor or sensory deficit.          Objective:   Past Medical History:   Diagnosis Date    39 weeks gestation of pregnancy ([facility]) 2020    Birth plan reviewed:   Support:  Wilder Pain management: Unmedicated, previous births were unmedicated Vitamin K: Yes Erythromycin ointment: Yes Placenta: Discard Circumcision: n/a Peds: Dr. Coburn with [place] Feeding method: breast Housing/car seat: has Contraception: none     Alpha thalassemia ([facility])     Amniotic fluid leaking ([facility]) 4/15/2023    Anemia     Since childhood    Blood disorder     Alpha Thalassemia carrier    Chlamydia 2016    Decorative tattoo     Human papilloma virus infection 2015    Papanicolaou smear of cervix with low grade squamous intraepithelial lesion (LGSIL) 2017    Repeat pap normal 2019 10/5/22- NILM, will repeat with HPV in 3 years    Sexually transmitted disease               Past Surgical History:   Procedure Laterality Date                      Social History     Socioeconomic History    Marital status:      Spouse name: Wilder Holguin    Number of children: 2    Years of education: 16    Highest education level: Bachelor's degree (e.g., BA, AB, BS)   Occupational History    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 & [employer]      Comment: Full time   Tobacco Use    Smoking status: Never    Smokeless tobacco: Never   Vaping Use     Vaping Use: Never used   Substance and Sexual Activity    Alcohol use: Not Currently     Comment: occasional prior to pregnancy     Drug use: No    Sexual activity: Yes     Partners: Male   Other Topics Concern     Service No    Blood Transfusions No    Caffeine Concern No    Occupational Exposure Yes     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     Social Determinants of Health     Financial Resource Strain: Low Risk  (4/15/2023)    Financial Resource Strain     Difficulty of Paying Living Expenses: Somewhat hard     Med Affordability: No   Food Insecurity: No Food Insecurity (4/15/2023)    Food Insecurity     Food Insecurity: Never true   Transportation Needs: No Transportation Needs (4/15/2023)    Transportation Needs     Lack of Transportation: No   Stress: No Stress Concern Present (4/15/2023)    Stress     Feeling of Stress : No   Housing Stability: Low Risk  (4/15/2023)    Housing Stability     Housing Instability: No              Review of Systems   Constitutional: Negative.    Musculoskeletal:  Positive for arthralgias.   Skin:  Negative for wound.   Neurological:  Negative for weakness and numbness.       Positive for stated complaint: Leg or Foot Injury  Other systems are as noted in HPI.  Constitutional and vital signs reviewed.      All other systems reviewed and negative except as noted above.    Physical Exam     ED Triage Vitals [04/01/24 1053]   /83   Pulse 77   Resp 18   Temp 97.5 °F (36.4 °C)   Temp src Temporal   SpO2 100 %   O2 Device None (Room air)       Current:/83   Pulse 77   Temp 97.5 °F (36.4 °C) (Temporal)   Resp 18   SpO2 100%         Physical Exam  Vitals and nursing note reviewed.   Constitutional:       General: She is not  in acute distress.  Cardiovascular:      Pulses: Normal pulses.   Pulmonary:      Effort: Pulmonary effort is normal. No respiratory distress.   Musculoskeletal:      Left ankle: Normal. No swelling or deformity. No tenderness.      Left foot: Normal capillary refill. Swelling and tenderness present. Normal pulse.        Feet:    Skin:     General: Skin is warm and dry.   Neurological:      Mental Status: She is alert and oriented to person, place, and time.   Psychiatric:         Behavior: Behavior normal.               ED Course   Labs Reviewed - No data to display  XR FOOT, COMPLETE (MIN 3 VIEWS), LEFT (CPT=73630)   Final Result   PROCEDURE: XR FOOT, COMPLETE (MIN 3 VIEWS), LEFT (CPT=73630)       COMPARISON: None.       INDICATIONS: Left lateral foot pain x 1 week post fall.       TECHNIQUE: 3 views were obtained.         FINDINGS:    BONES: There is an acute, obliquely oriented, and minimally displaced    fracture involving the mid to distal shaft of the 5th metatarsal.  No    evidence of intra-articular extension.  No additional fracture is    identified.  No dislocation.   SOFT TISSUES: There is soft tissue swelling about the lateral aspect of    the foot   EFFUSION: None visible.    OTHER: Negative.                    =====   CONCLUSION:        Acute, obliquely oriented and minimally displaced fracture involving the    mid to distal shaft of the 5th metatarsal.               Dictated by (CST): Dev Quintanilla MD on 4/01/2024 at 11:24 AM        Finalized by (CST): Dev Quintanilla MD on 4/01/2024 at 11:27 AM                 Short leg splint and crutches by tech; post application: good alignment; n/v intact.                   MDM      Radiographic findings reviewed with patient at bedside prior to disposition.  She was informed that it is imperative that she maintain the splint in position as placed here today until follow-up with an orthopedist.  She was informed that she must have follow-up with an orthopedist  for definitive treatment.  Failure to do so, could result in long-term negative sequelae.  She states understanding and agrees with plan.                                 Medical Decision Making  Differential diagnoses considered today include, but are not exclusive of: fracture, dislocation, strain, sprain, vascular compromise, and nerve impingement syndrome.      Problems Addressed:  Closed nondisplaced fracture of fifth metatarsal bone of left foot, initial encounter: undiagnosed new problem with uncertain prognosis    Amount and/or Complexity of Data Reviewed  Radiology:  Decision-making details documented in ED Course.    Risk  OTC drugs.        Disposition and Plan     Clinical Impression:  1. Closed nondisplaced fracture of fifth metatarsal bone of left foot, initial encounter    2. Injury of left foot, initial encounter         Disposition:  Discharge  4/1/2024 11:40 am    Follow-up:  Kevin Ashby MD  [address] [id number] [phone]    Call today  to schedule soonest available appointment.    [facility]   [address]  [facility] [address]  [phone]    As needed          Medications Prescribed:  Current Discharge Medication List                                          Consent (Ear)/Introductory Paragraph: The rationale for Mohs was explained to the patient and consent was obtained. The risks, benefits and alternatives to therapy were discussed in detail. Specifically, the risks of ear deformity, infection, scarring, bleeding, prolonged wound healing, incomplete removal, allergy to anesthesia, nerve injury and recurrence were addressed. Prior to the procedure, the treatment site was clearly identified and confirmed by the patient. All components of Universal Protocol/PAUSE Rule completed.

## 2024-04-01 NOTE — ED INITIAL ASSESSMENT (HPI)
Pt states tripped and fell on to her left foot a week ago. Pt states having pain bruising and pain but was healing well she thought but then began having swelling in that foot yesterday.

## 2024-04-01 NOTE — TELEPHONE ENCOUNTER
Spoke to patient. She is requesting status on referral. Rn relayed Dr. Weiler is off today and not back until tomorrow.     RN recommended an appointment with a provider to get her referral, she hasn't been seen in 2 years. [facility] has some openings this evening.     She will call  back.

## 2024-04-01 NOTE — TELEPHONE ENCOUNTER
Pt wanted to speak with Nurse as Pt broke her foot and she needs referral for orthopedic. PCP is out of office. Referral Dept suggested reaching out to Midwives.

## 2024-04-01 NOTE — TELEPHONE ENCOUNTER
Referral to Dr. Segal done.  Please ask patient to schedule physical in the future to get caught up

## 2024-04-01 NOTE — TELEPHONE ENCOUNTER
Dr Weiler,     Shows your last visit with patient was 2022.    Patient requesting referral to Dr. Mathis post ER visit.     Pended referral please review diagnosis and sign off if you agree.    Thank you.  Roxy Corrigan  [facility]

## 2024-05-22 ENCOUNTER — OFFICE VISIT (OUTPATIENT)
Dept: OBGYN CLINIC | Facility: CLINIC | Age: 29
End: 2024-05-22

## 2024-05-22 VITALS
SYSTOLIC BLOOD PRESSURE: 118 MMHG | HEIGHT: 65 IN | WEIGHT: 195 LBS | HEART RATE: 85 BPM | BODY MASS INDEX: 32.49 KG/M2 | DIASTOLIC BLOOD PRESSURE: 79 MMHG | TEMPERATURE: 98 F

## 2024-05-22 DIAGNOSIS — B37.2 YEAST DERMATITIS: Primary | ICD-10-CM

## 2024-05-22 PROCEDURE — 3078F DIAST BP <80 MM HG: CPT | Performed by: ADVANCED PRACTICE MIDWIFE

## 2024-05-22 PROCEDURE — 99213 OFFICE O/P EST LOW 20 MIN: CPT | Performed by: ADVANCED PRACTICE MIDWIFE

## 2024-05-22 PROCEDURE — 3074F SYST BP LT 130 MM HG: CPT | Performed by: ADVANCED PRACTICE MIDWIFE

## 2024-05-22 PROCEDURE — 3008F BODY MASS INDEX DOCD: CPT | Performed by: ADVANCED PRACTICE MIDWIFE

## 2024-05-22 RX ORDER — FLUCONAZOLE 200 MG/1
TABLET ORAL
Qty: 11 TABLET | Refills: 0 | Status: SHIPPED | OUTPATIENT
Start: 2024-05-22

## 2024-05-22 RX ORDER — NYSTATIN 100000 U/G
1 CREAM TOPICAL 2 TIMES DAILY
Qty: 30 G | Refills: 0 | Status: SHIPPED | OUTPATIENT
Start: 2024-05-22

## 2024-05-29 NOTE — PROGRESS NOTES
Chief Complaint:   Chief Complaint   Patient presents with    Gyn Exam     Patient complaining of breast pain on right side since Saturday  pt is still breastfeeding         HPI:   Tisha is 28 year old female, here today right breast and nipple pain.  Infant has been dx with thrush  .  She notes localized breast erythema, warmth,  and pain. Fever not present    Lactation history includes breastfeeding year old infant  Denies excessive vaginal bleeding or cramping, no clots or foul-smelling discharge  No urinary frequency, urgency, or burning.    HISTORY:  Past Medical History:    39 weeks gestation of pregnancy ([facility])    Birth plan reviewed:   Support:  Wilder Pain management: Unmedicated, previous births were unmedicated Vitamin K: Yes Erythromycin ointment: Yes Placenta: Discard Circumcision: n/a Peds: Dr. Coburn with [place] Feeding method: breast Housing/car seat: has Contraception: none     Alpha thalassemia ([facility])    Amniotic fluid leaking ([facility])    Anemia    Since childhood    Blood disorder    Alpha Thalassemia carrier    Chlamydia    Decorative tattoo    Human papilloma virus infection    Papanicolaou smear of cervix with low grade squamous intraepithelial lesion (LGSIL)    Repeat pap normal 2019 10/5/22- NILM, will repeat with HPV in 3 years    Sexually transmitted disease      Past Surgical History:   Procedure Laterality Date            Family History   Problem Relation Age of Onset    Carotid stenosis Father     Lung Disorder Father         pulmonary fibrosis    Hypertension Father     Lipids Father     Diabetes Father         type 2    Breast Cancer Mother 52        alive and well, was BRCA negative    Osteoporosis Mother     Diabetes Maternal Grandmother     No Known Problems Maternal Grandfather     Lung Disorder Paternal Grandmother         emphysema    Breast Cancer Maternal Aunt 40         of breast cancer    No Known Problems Daughter     Lung Disorder Paternal Grandfather      No Known Problems Daughter     Colon Cancer Neg     Ovarian Cancer Neg       Social History:   Social History     Socioeconomic History    Marital status:      Spouse name: Wilder Holguin    Number of children: 2    Years of education: 16    Highest education level: Bachelor's degree (e.g., BA, AB, BS)   Occupational History    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[employer]      Comment: Full time   Tobacco Use    Smoking status: Never    Smokeless tobacco: Never   Vaping Use    Vaping status: Never Used   Substance and Sexual Activity    Alcohol use: Not Currently     Comment: occasional prior to pregnancy     Drug use: No    Sexual activity: Yes     Partners: Male   Other Topics Concern     Service No    Blood Transfusions No    Caffeine Concern No    Occupational Exposure Yes     Comment: Works at [employer]    Hobby Hazards No    Stress Concern No    Special Diet No    Exercise Yes     Comment: chasing children     Seat Belt Yes   Social History Narrative    Recently , lives with         Feels safe, no h/o physical or sexual abuse.     Social Determinants of Health     Financial Resource Strain: Low Risk  (4/15/2023)    Financial Resource Strain     Difficulty of Paying Living Expenses: Somewhat hard     Med Affordability: No   Food Insecurity: No Food Insecurity (4/15/2023)    Food Insecurity     Food Insecurity: Never true   Transportation Needs: No Transportation Needs (4/15/2023)    Transportation Needs     Lack of Transportation: No   Stress: No Stress Concern Present (4/15/2023)    Stress     Feeling of Stress : No   Housing Stability: Low Risk  (4/15/2023)    Housing Stability     Housing Instability: No        Medications (Active prior to today's visit):  Current Outpatient Medications   Medication Sig Dispense Refill    fluconazole (DIFLUCAN) 200 MG Oral Tab Take 400mg  (2 tablets) first day then 200mg daily 11 tablet 0    nystatin 100,000 Units/g  External Cream Apply 1 Application topically 2 (two) times daily. 30 g 0    Misc. Devices (BREAST PUMP) Does not apply Misc Double Electric Breast Pump SHANNAN 04/16/2023 1 each 0    Cholecalciferol (VITAMIN D3) 1000 units Oral Cap Take 1 tablet by mouth daily.      Ferrous Sulfate 325 (65 Fe) MG Oral Tab Take 1 tablet (325 mg total) by mouth daily with breakfast. 90 tablet 1    Misc. Devices (BREAST PUMP) Does not apply Misc Double electric breast pump SHANNAN 4/16/23  Edgepark 1 each 0       Allergies:  No Known Allergies    ROS:   Review of Systems   Constitutional: Negative.    Respiratory: Negative.     Cardiovascular: Negative.        PHYSICAL EXAM:     Vitals:    05/22/24 1714   BP: 118/79   Pulse: 85   Temp: 98.3 °F (36.8 °C)     Physical Exam  Constitutional:       General: She is not in acute distress.     Appearance: Normal appearance. She is not ill-appearing, toxic-appearing or diaphoretic.   Cardiovascular:      Rate and Rhythm: Normal rate.   Pulmonary:      Effort: Pulmonary effort is normal.   Chest:   Breasts:     Right: Skin change and tenderness present. No swelling, bleeding, inverted nipple, mass or nipple discharge.      Left: No swelling, bleeding, inverted nipple, mass, skin change or tenderness.   Lymphadenopathy:      Upper Body:      Right upper body: No axillary adenopathy.      Left upper body: No axillary adenopathy.   Neurological:      Mental Status: She is alert and oriented to person, place, and time.           ASSESSMENT/PLAN:     Tisha was seen today for gyn exam.    Diagnoses and all orders for this visit:    Yeast dermatitis    Other orders  -     fluconazole (DIFLUCAN) 200 MG Oral Tab; Take 400mg  (2 tablets) first day then 200mg daily  -     nystatin 100,000 Units/g External Cream; Apply 1 Application topically 2 (two) times daily.        Counseling included:  -- Importance of taking entire course of Diflucan as prescribed  -- OTC pain relievers such as Tylenol or ibuprofen are  safe while breastfeeding  -- Safety and benefits of continued breastfeeding    Pt voiced understanding and agreed with plan. All questions answered. No barriers to learning identified.    20 minutes face to face counseling, chart review, orders and coordination of care

## 2024-08-07 ENCOUNTER — OFFICE VISIT (OUTPATIENT)
Dept: FAMILY MEDICINE CLINIC | Facility: CLINIC | Age: 29
End: 2024-08-07

## 2024-08-07 VITALS
TEMPERATURE: 98 F | RESPIRATION RATE: 16 BRPM | HEIGHT: 64.84 IN | WEIGHT: 190 LBS | BODY MASS INDEX: 31.65 KG/M2 | HEART RATE: 88 BPM | SYSTOLIC BLOOD PRESSURE: 105 MMHG | DIASTOLIC BLOOD PRESSURE: 70 MMHG

## 2024-08-07 DIAGNOSIS — D56.9 THALASSEMIA, UNSPECIFIED TYPE: ICD-10-CM

## 2024-08-07 DIAGNOSIS — Z01.84 IMMUNITY STATUS TESTING: ICD-10-CM

## 2024-08-07 DIAGNOSIS — E55.9 VITAMIN D DEFICIENCY: ICD-10-CM

## 2024-08-07 DIAGNOSIS — Z00.00 ROUTINE PHYSICAL EXAMINATION: Primary | ICD-10-CM

## 2024-08-07 PROCEDURE — 3074F SYST BP LT 130 MM HG: CPT | Performed by: FAMILY MEDICINE

## 2024-08-07 PROCEDURE — 3008F BODY MASS INDEX DOCD: CPT | Performed by: FAMILY MEDICINE

## 2024-08-07 PROCEDURE — 99395 PREV VISIT EST AGE 18-39: CPT | Performed by: FAMILY MEDICINE

## 2024-08-07 PROCEDURE — 3078F DIAST BP <80 MM HG: CPT | Performed by: FAMILY MEDICINE

## 2024-08-07 NOTE — PROGRESS NOTES
HPI:  28 yr old female who presents for physical.  with 3 kids- ages 6,4 and 1.  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  Limited exercise. Eats healthy.     Breastfeeding.  No periods yet. Uses condoms.     History of thalassemia.  On iron.     Has urinary incontinence when she is coughing a lot. Had 3 vaginal deliveries.     PMHx: reviewed, see chart  PSHx: reviewed, see chart  All: Patient has no known allergies.   Meds: see chart    ROS:   Allergic/Immuno:  Negative for environmental allergies and food allergies  Cardiovascular:  Negative for chest pain and irregular heartbeat/palpitations  Constitutional:  Negative for decreased activity, fever, irritability and lethargy  Endocrine:  Negative for abnormal sleep patterns, increased activity, polydipsia and polyphagia  ENMT:  Negative for ear drainage, hearing loss and nasal drainage  Eyes:  Negative for eye discharge and vision loss  Gastrointestinal:  Negative for abdominal pain, constipation, decreased appetite, diarrhea and vomiting  Genitourinary:  Negative for dysuria and hematuria  Hema/Lymph:  Negative for easy bleeding and easy bruising  Integumentary:  Negative for pruritus and rash  Musculoskeletal:  Negative for bone/joint symptoms  Neurological:  Negative for gait disturbance  Psychiatric:  Negative for inappropriate interaction and psychiatric symptoms    PE:  /70   Pulse 88   Temp 98.1 °F (36.7 °C) (Temporal)   Resp 16   Ht 5' 4.84\" (1.647 m)   Wt 190 lb (86.2 kg)   BMI 31.77 kg/m²   Gen:  Well-nourished.  No distress.  HEENT: Conjunctive clear.  Alirio ear canals clear.  Alirio TMs intact with good landmarks noted.  Nares patent.  Oral mucous membrane moist.  Normal lips, teeth, and gums.  Oropharynx normal.  Neck supple.  Good ROM.  No LAD.  Thyroid normal.  CV:  Regular rate and rhythm; no murmurs  Lungs:  Clear to ausculation; good aeration               No wheezes, rales or rhonchi  Abd:  soft, non-tender, non-distended          Normal bowel sounds; no masses          No hepatosplenomegaly  Extremities: No cyanosis, clubbing, edema.  Pedal pulses 2+ magnolia.  MSK:  No abnormalities.  Skin: Warm/dry/intact.  No abnormal moles/lesions noted.  No rashes.      A/P:  Encounter Diagnoses   Name Primary?    Routine physical examination    Encouraged exercise and healthy diet.    Yes    Immunity status testing    Check titers today.        Vitamin D deficiency    Check Vitamin D level.       Thalassemia, unspecified type    On iron.  Will check CBC and iron levels.         Colleen Weiler, DO

## 2024-08-15 ENCOUNTER — LAB ENCOUNTER (OUTPATIENT)
Dept: LAB | Facility: HOSPITAL | Age: 29
End: 2024-08-15
Attending: FAMILY MEDICINE
Payer: COMMERCIAL

## 2024-08-15 DIAGNOSIS — Z01.84 IMMUNITY STATUS TESTING: ICD-10-CM

## 2024-08-15 DIAGNOSIS — E55.9 VITAMIN D DEFICIENCY: ICD-10-CM

## 2024-08-15 DIAGNOSIS — Z00.00 ROUTINE PHYSICAL EXAMINATION: ICD-10-CM

## 2024-08-15 LAB
DEPRECATED RDW RBC AUTO: 41.8 FL (ref 35.1–46.3)
ERYTHROCYTE [DISTWIDTH] IN BLOOD BY AUTOMATED COUNT: 18.9 % (ref 11–15)
HBV SURFACE AB SER QL: REACTIVE
HBV SURFACE AB SERPL IA-ACNC: 12.17 MIU/ML
HCT VFR BLD AUTO: 34.2 %
HGB BLD-MCNC: 11.1 G/DL
IRON SATN MFR SERPL: 27 %
IRON SERPL-MCNC: 81 UG/DL
MCH RBC QN AUTO: 21.2 PG (ref 26–34)
MCHC RBC AUTO-ENTMCNC: 32.5 G/DL (ref 31–37)
MCV RBC AUTO: 65.4 FL
PLATELET # BLD AUTO: 322 10(3)UL (ref 150–450)
RBC # BLD AUTO: 5.23 X10(6)UL
RUBV IGG SER QL: POSITIVE
RUBV IGG SER-ACNC: 53 IU/ML (ref 10–?)
TIBC SERPL-MCNC: 295 UG/DL (ref 250–425)
TRANSFERRIN SERPL-MCNC: 198 MG/DL (ref 250–380)
VIT D+METAB SERPL-MCNC: 27.5 NG/ML (ref 30–100)
WBC # BLD AUTO: 7.2 X10(3) UL (ref 4–11)

## 2024-08-15 PROCEDURE — 86706 HEP B SURFACE ANTIBODY: CPT

## 2024-08-15 PROCEDURE — 85060 BLOOD SMEAR INTERPRETATION: CPT

## 2024-08-15 PROCEDURE — 82306 VITAMIN D 25 HYDROXY: CPT

## 2024-08-15 PROCEDURE — 86735 MUMPS ANTIBODY: CPT

## 2024-08-15 PROCEDURE — 86762 RUBELLA ANTIBODY: CPT

## 2024-08-15 PROCEDURE — 86787 VARICELLA-ZOSTER ANTIBODY: CPT

## 2024-08-15 PROCEDURE — 36415 COLL VENOUS BLD VENIPUNCTURE: CPT

## 2024-08-15 PROCEDURE — 83540 ASSAY OF IRON: CPT

## 2024-08-15 PROCEDURE — 86480 TB TEST CELL IMMUN MEASURE: CPT

## 2024-08-15 PROCEDURE — 84466 ASSAY OF TRANSFERRIN: CPT

## 2024-08-15 PROCEDURE — 85027 COMPLETE CBC AUTOMATED: CPT

## 2024-08-15 PROCEDURE — 86765 RUBEOLA ANTIBODY: CPT

## 2024-08-16 LAB
MEV IGG SER-ACNC: <5 AU/ML (ref 16.5–?)
MUV IGG SER IA-ACNC: 26 AU/ML (ref 11–?)
VZV IGG SER IA-ACNC: 539.8 (ref 165–?)

## 2024-08-17 LAB
M TB IFN-G CD4+ T-CELLS BLD-ACNC: 0.01 IU/ML
M TB TUBERC IFN-G BLD QL: NEGATIVE
M TB TUBERC IGNF/MITOGEN IGNF CONTROL: 8.3 IU/ML
QFT TB1 AG MINUS NIL: 0 IU/ML
QFT TB2 AG MINUS NIL: 0.01 IU/ML

## 2024-08-27 DIAGNOSIS — Z01.84 IMMUNITY STATUS TESTING: Primary | ICD-10-CM

## 2025-02-11 ENCOUNTER — OFFICE VISIT (OUTPATIENT)
Dept: FAMILY MEDICINE CLINIC | Facility: CLINIC | Age: 30
End: 2025-02-11
Payer: COMMERCIAL

## 2025-02-11 VITALS
DIASTOLIC BLOOD PRESSURE: 74 MMHG | RESPIRATION RATE: 16 BRPM | TEMPERATURE: 99 F | HEART RATE: 99 BPM | HEIGHT: 64.84 IN | BODY MASS INDEX: 30.66 KG/M2 | OXYGEN SATURATION: 98 % | SYSTOLIC BLOOD PRESSURE: 112 MMHG | WEIGHT: 184 LBS

## 2025-02-11 DIAGNOSIS — B34.9 ACUTE VIRAL SYNDROME: Primary | ICD-10-CM

## 2025-02-11 LAB
CONTROL LINE PRESENT WITH A CLEAR BACKGROUND (YES/NO): YES YES/NO
STREP GRP A CUL-SCR: NEGATIVE

## 2025-02-11 PROCEDURE — 99213 OFFICE O/P EST LOW 20 MIN: CPT | Performed by: PHYSICIAN ASSISTANT

## 2025-02-11 PROCEDURE — 3008F BODY MASS INDEX DOCD: CPT | Performed by: PHYSICIAN ASSISTANT

## 2025-02-11 PROCEDURE — 87637 SARSCOV2&INF A&B&RSV AMP PRB: CPT | Performed by: PHYSICIAN ASSISTANT

## 2025-02-11 PROCEDURE — 87880 STREP A ASSAY W/OPTIC: CPT | Performed by: PHYSICIAN ASSISTANT

## 2025-02-11 PROCEDURE — 3074F SYST BP LT 130 MM HG: CPT | Performed by: PHYSICIAN ASSISTANT

## 2025-02-11 PROCEDURE — 3078F DIAST BP <80 MM HG: CPT | Performed by: PHYSICIAN ASSISTANT

## 2025-02-11 NOTE — PROGRESS NOTES
CHIEF COMPLAINT:     Chief Complaint   Patient presents with    Sore Throat     3 days w/ congestion, body aches, fever,cough, and fatigue       HPI:   Tisha Navarro is a 29 year old female who presents with complaints of feeling sick for the past 3 days.  Symptoms include nasal congestion, nasal drainage, fatigue, sneezing, cough, and fever.  The patient reports at home Tmax of 101.  The patient last had Tylenol 3 hours ago.  The patient denies ear pain, chest pain, shortness of breath, wheezing, abdominal pain, vomiting, diarrhea, and rash.  The patient is tolerating p.o.  The patient denies any recent COVID infection.    Current Outpatient Medications   Medication Sig Dispense Refill    Misc. Devices (BREAST PUMP) Does not apply Misc Double electric breast pump SHANNAN 4/16/23  Edgepark 1 each 0    Misc. Devices (BREAST PUMP) Does not apply Misc Double Electric Breast Pump SHANNAN 04/16/2023 1 each 0    Cholecalciferol (VITAMIN D3) 1000 units Oral Cap Take 1 tablet by mouth daily.      Ferrous Sulfate 325 (65 Fe) MG Oral Tab Take 1 tablet (325 mg total) by mouth daily with breakfast. 90 tablet 1      Past Medical History:    39 weeks gestation of pregnancy ([facility])    Birth plan reviewed:   Support:  Wilder Pain management: Unmedicated, previous births were unmedicated Vitamin K: Yes Erythromycin ointment: Yes Placenta: Discard Circumcision: n/a Peds: Dr. Coburn with [place] Feeding method: breast Housing/car seat: has Contraception: none     Alpha thalassemia ([facility])    Amniotic fluid leaking ([facility])    Anemia    Since childhood    Blood disorder    Alpha Thalassemia carrier    Chlamydia    Decorative tattoo    Human papilloma virus infection    Papanicolaou smear of cervix with low grade squamous intraepithelial lesion (LGSIL)    Repeat pap normal 11/2019 10/5/22- NILM, will repeat with HPV in 3 years    Sexually transmitted disease      Social History:  Social History     Socioeconomic History    Marital status:       Spouse name: Wilder Holguin    Number of children: 2    Years of education: 16    Highest education level: Bachelor's degree (e.g., BA, AB, BS)   Occupational History    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[employer]      Comment: Full time   Tobacco Use    Smoking status: Never    Smokeless tobacco: Never   Vaping Use    Vaping status: Never Used   Substance and Sexual Activity    Alcohol use: Not Currently     Comment: occasional prior to pregnancy     Drug use: No    Sexual activity: Yes     Partners: Male   Other Topics Concern     Service No    Blood Transfusions No    Caffeine Concern No    Occupational Exposure Yes     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     Social Drivers of Health     Food Insecurity: No Food Insecurity (4/15/2023)    Food Insecurity     Food Insecurity: Never true   Transportation Needs: No Transportation Needs (4/15/2023)    Transportation Needs     Lack of Transportation: No   Stress: No Stress Concern Present (4/15/2023)    Stress     Feeling of Stress : No   Housing Stability: Low Risk  (4/15/2023)    Housing Stability     Housing Instability: No        REVIEW OF SYSTEMS:   GENERAL: See HPI  SKIN: Denies rashes, skin wounds or ulcers.  EYES: Denies blurred vision or double vision  HENT: See HPI  CHEST: Denies chest pain, or palpitations  LUNGS: See HPI  GI: Denies abdominal pain, N/V/C/D.   MUSCULOSKELETAL: no arthralgia or swollen joints  LYMPH:  Denies lymphadenopathy  NEURO: Denies headaches or lightheadedness      EXAM:   /74   Pulse 99   Temp 98.5 °F (36.9 °C)   Resp 16   Ht 5' 4.84\" (1.647 m)   Wt 184 lb (83.5 kg)   LMP 02/01/2025 (Exact Date)   SpO2 98%   Breastfeeding Yes   BMI 30.77 kg/m²    GENERAL: well developed, well nourished,in no apparent distress, cooperative   SKIN: no rashes, nosuspicious lesions, no abnormal pigmentation  HEAD: atraumatic, normocephalic  EYES: EOM intact, PERRL.  Conjunctiva normal.  Cornea clear.  Lid margins normal.  No active drainage.  EARS: Right TM normal, no bulging, no retraction, no fluid, bony landmarks normal.  Left TM normal, no bulging, no retraction, no fluid, bony landmarks normal.    NOSE: nostrils patent, no discharge, nasal mucosa pink and not inflamed.  No sinus tenderness.  THROAT: oral mucosa pink, moist and intact. No visible dental caries. Posterior pharynx with minimal erythema without exudates.  +Uvula is midline.   NECK: supple, non-tender.  LUNGS: clear to auscultation bilaterally, no wheezes or rhonchi. Breathing is non labored.  CARDIO: RRR without murmur  GI: No visible scars, or masses. BS's present x4. No palpable masses or hepatosplenomegaly.  Non tender.  No guarding or rebound tenderness  EXTREMITIES: no cyanosis, clubbing or edema.  Homans NEG.  Dorsalis Pedis 2+.  LYMPH:  No lymphadenopathy.    NEURO: A&Ox3.  CN II-XII intact.  No focal deficits.  Coordination and Gait normal.  Kernig and Brudzinski's are negative.    Rapid Strep is NEGATIVE     Patient declined Tamiflu      ASSESSMENT AND PLAN:     ASSESSMENT:  Encounter Diagnosis   Name Primary?    Acute viral syndrome Yes       PLAN:    Patient Instructions   OTC supportive care  Fluids/rest  Follow up with PCP   If worse seek treatment

## 2025-02-12 LAB
FLUAV + FLUBV RNA SPEC NAA+PROBE: NEGATIVE
FLUAV + FLUBV RNA SPEC NAA+PROBE: POSITIVE
RSV RNA SPEC NAA+PROBE: NEGATIVE
SARS-COV-2 RNA RESP QL NAA+PROBE: NOT DETECTED
SARS-COV-2 RNA RESP QL NAA+PROBE: NOT DETECTED

## (undated) DIAGNOSIS — Z00.00 ROUTINE PHYSICAL EXAMINATION: Primary | ICD-10-CM

## (undated) DIAGNOSIS — E55.9 VITAMIN D DEFICIENCY: ICD-10-CM

## (undated) DIAGNOSIS — E55.9 VITAMIN D DEFICIENCY: Primary | ICD-10-CM

## (undated) NOTE — LETTER
Dragan Martinez, :10/23/1995    CONSENT FOR PROCEDURE/SEDATION    1. I authorize the performance upon Dragan Martinez  the following: Colposcopy    2.  I authorize Dr. Tory Mccall MD (and whomever is designated as the doctor’s assistant), to Witness: _________________________________________ Date:___________     Physician Signature: _______________________________ Date:___________

## (undated) NOTE — LETTER
VACCINE ADMINISTRATION RECORD  PATIENT / PARENT / GUARDIAN APPROVAL  Date: 2020  Vaccine administered to: Baron Ho     : 10/23/1995    MRN: [id number]    A copy of the appropriate Centers for Disease Control and Prevention Vaccine Information

## (undated) NOTE — LETTER
8/27/2020              Chace Smas        [address] [facility] [id number]         To whom it may concern,        Stalin Young is a patient of our clinic. She tested positive for COVID. She has been asymptomatic.   Current CDC guidelines stat

## (undated) NOTE — LETTER
7/2/2020              Danielle Brown        [facility] [address] [id number]         To Whom It May Concern,    Danielle Brown has been under my care regarding her pregnancy and for postpartum care.  She was seen in my office today and has bee

## (undated) NOTE — LETTER
23      Romel Metzger        :  10/23/1995        To Whom It May Concern:    Romel Metzger  has recently been under my care. She had a vaginal delivery on April 15,2023. At this time, I have determined she may return to work effective 2023, without restrictions. If you have any questions, please have Corrinne Maillard to contact our office at any time.     Sincerely,        Kelly Almonte CNM

## (undated) NOTE — LETTER
VACCINE ADMINISTRATION RECORD  PARENT / GUARDIAN APPROVAL  Date: 2/3/2023  Vaccine administered to: Alejandra Strickland     : 10/23/1995    MRN: [id number]    A copy of the appropriate Centers for Disease Control and Prevention Vaccine Information statement has been provided. I have read or have had explained the information about the diseases and the vaccines listed below. There was an opportunity to ask questions and any questions were answered satisfactorily. I believe that I understand the benefits and risks of the vaccine cited and ask that the vaccine(s) listed below be given to me or to the person named above (for whom I am authorized to make this request). VACCINES ADMINISTERED:  Tdap    I have read and hereby agree to be bound by the terms of this agreement as stated above. My signature is valid until revoked by me in writing. This document is signed by Elisabeth Weber relationship: Self on 2/3/2023.                                                                                                                                          Parent / Candi Lucio Signature                                                Date